# Patient Record
Sex: MALE | Race: WHITE
[De-identification: names, ages, dates, MRNs, and addresses within clinical notes are randomized per-mention and may not be internally consistent; named-entity substitution may affect disease eponyms.]

---

## 2017-06-11 ENCOUNTER — HOSPITAL ENCOUNTER (INPATIENT)
Dept: HOSPITAL 31 - C.ER | Age: 77
LOS: 4 days | Discharge: HOME | DRG: 190 | End: 2017-06-15
Attending: FAMILY MEDICINE | Admitting: FAMILY MEDICINE
Payer: MEDICARE

## 2017-06-11 VITALS — BODY MASS INDEX: 29.9 KG/M2

## 2017-06-11 DIAGNOSIS — J44.1: ICD-10-CM

## 2017-06-11 DIAGNOSIS — Z82.49: ICD-10-CM

## 2017-06-11 DIAGNOSIS — E78.5: ICD-10-CM

## 2017-06-11 DIAGNOSIS — N40.0: ICD-10-CM

## 2017-06-11 DIAGNOSIS — I10: ICD-10-CM

## 2017-06-11 DIAGNOSIS — E78.00: ICD-10-CM

## 2017-06-11 DIAGNOSIS — J18.9: ICD-10-CM

## 2017-06-11 DIAGNOSIS — J45.901: ICD-10-CM

## 2017-06-11 DIAGNOSIS — J44.0: Primary | ICD-10-CM

## 2017-06-11 LAB
ALBUMIN/GLOB SERPL: 1.2 {RATIO} (ref 1–2.1)
ALP SERPL-CCNC: 55 U/L (ref 38–126)
ALT SERPL-CCNC: 18 U/L (ref 21–72)
AST SERPL-CCNC: 19 U/L (ref 17–59)
BASOPHILS # BLD AUTO: 0.1 K/UL (ref 0–0.2)
BASOPHILS NFR BLD: 0.9 % (ref 0–2)
BILIRUB SERPL-MCNC: 1.1 MG/DL (ref 0.2–1.3)
BUN SERPL-MCNC: 20 MG/DL (ref 9–20)
CALCIUM SERPL-MCNC: 9.4 MG/DL (ref 8.6–10.4)
CHLORIDE SERPL-SCNC: 104 MMOL/L (ref 98–107)
CO2 SERPL-SCNC: 26 MMOL/L (ref 22–30)
EOSINOPHIL # BLD AUTO: 0.5 K/UL (ref 0–0.7)
EOSINOPHIL NFR BLD: 4.6 % (ref 0–4)
EOSINOPHIL NFR BLD: 6 % (ref 0–4)
ERYTHROCYTE [DISTWIDTH] IN BLOOD BY AUTOMATED COUNT: 14.1 % (ref 11.5–14.5)
GLOBULIN SER-MCNC: 3.3 GM/DL (ref 2.2–3.9)
GLUCOSE SERPL-MCNC: 121 MG/DL (ref 75–110)
HCT VFR BLD CALC: 39.5 % (ref 35–51)
LG PLATELETS BLD QL SMEAR: PRESENT
LYMPHOCYTES # BLD AUTO: 0.9 K/UL (ref 1–4.3)
LYMPHOCYTES NFR BLD AUTO: 8.7 % (ref 20–40)
MCH RBC QN AUTO: 30 PG (ref 27–31)
MCHC RBC AUTO-ENTMCNC: 33.7 G/DL (ref 33–37)
MCV RBC AUTO: 89.3 FL (ref 80–94)
MONOCYTES # BLD: 0.7 K/UL (ref 0–0.8)
MONOCYTES NFR BLD: 6.5 % (ref 0–10)
NEUTROPHILS NFR BLD AUTO: 80 % (ref 50–75)
NRBC BLD AUTO-RTO: 0 % (ref 0–2)
PLATELET # BLD: 184 K/UL (ref 130–400)
PMV BLD AUTO: 8.6 FL (ref 7.2–11.7)
POTASSIUM SERPL-SCNC: 3.7 MMOL/L (ref 3.6–5.2)
PROT SERPL-MCNC: 7.3 G/DL (ref 6.3–8.3)
SODIUM SERPL-SCNC: 141 MMOL/L (ref 132–148)
TOTAL CELLS COUNTED BLD: 100
WBC # BLD AUTO: 10.6 K/UL (ref 4.8–10.8)

## 2017-06-11 RX ADMIN — METHYLPREDNISOLONE SODIUM SUCCINATE SCH MG: 40 INJECTION, POWDER, FOR SOLUTION INTRAMUSCULAR; INTRAVENOUS at 16:34

## 2017-06-11 RX ADMIN — METHYLPREDNISOLONE SODIUM SUCCINATE SCH MG: 40 INJECTION, POWDER, FOR SOLUTION INTRAMUSCULAR; INTRAVENOUS at 22:15

## 2017-06-11 RX ADMIN — PANTOPRAZOLE SODIUM SCH MG: 40 TABLET, DELAYED RELEASE ORAL at 16:34

## 2017-06-11 RX ADMIN — Medication SCH: at 19:19

## 2017-06-11 RX ADMIN — IPRATROPIUM BROMIDE AND ALBUTEROL SULFATE SCH ML: .5; 3 SOLUTION RESPIRATORY (INHALATION) at 19:12

## 2017-06-11 NOTE — CP.PCM.CON
History of Present Illness





- History of Present Illness


History of Present Illness: 





Reason for consultation: Shortness of breath and cough





77-year-old male with history COPD/asthma presented to emergency room with 

complaints of SOB, wheezing and non productive cough for the past 3 days. 

Patient states he has been using his inhaler without relief. Denies fever, 

chest pain, nausea, vomiting, abdominal pain, leg swelling, weakness or numbness





Review of Systems





- Review of Systems


All systems: reviewed and no additional remarkable complaints except (Shortness 

of breath and cough)





Past Patient History





- Infectious Disease


Hx of Infectious Diseases: None





- Past Medical History & Family History


Past Medical History?: Yes





- Past Social History


Smoking Status: Never Smoked





- CARDIAC


Hx Hypercholesterolemia: Yes


Hx Hypertension: Yes





- PULMONARY


Hx Asthma: Yes


Hx Chronic Obstructive Pulmonary Disease (COPD): Yes





- NEUROLOGICAL


Hx Neurological Disorder: No





- HEENT


Hx HEENT Problems: Yes


Other/Comment: wear eyeglasses





- RENAL


Hx Chronic Kidney Disease: No





- ENDOCRINE/METABOLIC


Hx Endocrine Disorders: No





- HEMATOLOGICAL/ONCOLOGICAL


Hx Blood Disorders: No





- INTEGUMENTARY


Hx Dermatological Problems: No





- MUSCULOSKELETAL/RHEUMATOLOGICAL


Hx Falls: No





- GASTROINTESTINAL


Hx Gastritis: Yes





- GENITOURINARY/GYNECOLOGICAL


Hx Genitourinary Disorders: Yes


Hx Prostate Problems: Yes





- PSYCHIATRIC


Hx Anxiety: Yes


Hx Depression: Yes


Hx Substance Use: No





- SURGICAL HISTORY


Hx Appendectomy: Yes





- ANESTHESIA


Hx Anesthesia: Yes


Hx Anesthesia Reactions: No


Hx Malignant Hyperthermia: No





Meds


Allergies/Adverse Reactions: 


 Allergies











Allergy/AdvReac Type Severity Reaction Status Date / Time


 


No Known Allergies Allergy   Verified 06/11/17 10:48














- Medications


Medications: 


 Current Medications





Albuterol/Ipratropium (Duoneb 3 Mg/0.5 Mg (3 Ml) Ud)  3 ml INH RQ6 [facility]


Aspirin (Ecotrin)  81 mg PO DAILY [facility]


   Last Admin: 06/11/17 16:34 Dose:  81 mg


Finasteride (Proscar)  5 mg PO DAILY [facility]


Heparin Sodium (Porcine) (Heparin)  5,000 units SC Q12 [facility]


Hydrochlorothiazide (Microzide)  12.5 mg PO DAILY [facility]


Lisinopril (Zestril)  20 mg PO DAILY [facility]


   Last Admin: 06/11/17 16:34 Dose:  20 mg


Methylprednisolone (Solu-Medrol)  40 mg IVP Q6H [facility]


   Last Admin: 06/11/17 16:34 Dose:  40 mg


Montelukast Sodium (Singulair)  10 mg PO HS [facility]


Pantoprazole Sodium (Protonix Ec Tab)  40 mg PO DAILY [facility]


   Last Admin: 06/11/17 16:34 Dose:  40 mg


Rosuvastatin Calcium (Crestor)  10 mg PO HS [facility]


Fluticasone/Salmeterol (Advair Diskus 250/50)  1 puff IH RQD [facility]


Vitamin B Complex/Vitamin C (Berocca)  1 tab PO DAILY [facility]











Physical Exam





- Head Exam


Head Exam: ATRAUMATIC, NORMOCEPHALIC





- Eye Exam


Eye Exam: Normal appearance





- ENT Exam


ENT Exam: Mucous Membranes Moist





- Neck Exam


Neck exam: Positive for: Normal Inspection





- Respiratory Exam


Respiratory Exam: Rhonchi, Wheezes





- Cardiovascular Exam


Cardiovascular Exam: REGULAR RHYTHM





- GI/Abdominal Exam


GI & Abdominal Exam: Normal Bowel Sounds, Soft





- Extremities Exam


Extremities exam: Positive for: normal inspection





- Neurological Exam


Neurological exam: Alert, Oriented x3





Results





- Vital Signs


Recent Vital Signs: 


 Last Vital Signs











Temp  98.2 F   06/11/17 10:51


 


Pulse  95 H  06/11/17 15:26


 


Resp  20   06/11/17 15:26


 


BP  119/74   06/11/17 15:26


 


Pulse Ox  95   06/11/17 15:26














- Labs


Result Diagrams: 


 06/11/17 11:15





 06/11/17 11:15





Assessment & Plan


(1) COPD exacerbation


Status: Acute   


Comment: Started on IV steroids, nebulizer treatment.  Antibiotics.  Follow-up 

ABG

## 2017-06-11 NOTE — CP.PCM.HP
<Bill Figueroa - Last Filed: 17 17:32>





History of Present Illness





- History of Present Illness


History of Present Illness: 











CC:  "trouble breathing"





HPI:  Patient is a 77 year old male with a history of COPD, asthma, HTN, and 

hyperlipedmia.  He is here because of 3 days of worsening cough, wheezing, 

dyspnea, and some mild chest tightness and discomfort.  He also says he has 

felt some dizziness and feeling light headed as well but denies any room 

spining sensation or hearing loss.  He also denies fever, chills, palpitations, 

night sweats, recent weight changes, nausea, vomiting, diarrhea, heart burn, 

acid taste, dysuria, anxiety, depression, numbness, weakness, muscle weakness, 

joint pain or swelling.  He also reports taking the medication has prescribed.  








PMHx: COPD, Asthma, BPH, HTN, Hyperlipidemia


PSHx: Appendectomy, Prostate Surgery


Social: Denies current or past use of cigarettes, drugs, and alcohol. Worked 

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 

harmful toxins that precipitated his asthma/COPD. Lives alone.


Family History: Father,  at 74,  of "stomach pain and natural causes

", Mother,  at 74


Allergies: Seasonal NKDA


PMD: Dr. JOSE ANTONIO Chau (pt just got Medicare, haven't seen yet) 





Present on Admission





- Present on Admission


Any Indicators Present on Admission: No


History of DVT/PE: No


History of Uncontrolled Diabetes: No


Urinary Catheter: No


Decubitus Ulcer Present: No


History Surgical Site Infection Following: None





Review of Systems





- Review of Systems


All systems: reviewed and no additional remarkable complaints except





- Constitutional


Constitutional: absent: Chills, Fever





- EENT


Eyes: absent: Change in Vision, Other Visual Disturbances


Ears: absent: Dizziness





Past Patient History





- Infectious Disease


Hx of Infectious Diseases: None





- Past Medical History & Family History


Past Medical History?: Yes





- Past Social History


Smoking Status: Never Smoked





- CARDIAC


Hx Hypercholesterolemia: Yes


Hx Hypertension: Yes





- PULMONARY


Hx Asthma: Yes


Hx Chronic Obstructive Pulmonary Disease (COPD): Yes





- NEUROLOGICAL


Hx Neurological Disorder: No





- HEENT


Hx HEENT Problems: Yes


Other/Comment: wear eyeglasses





- RENAL


Hx Chronic Kidney Disease: No





- ENDOCRINE/METABOLIC


Hx Endocrine Disorders: No





- HEMATOLOGICAL/ONCOLOGICAL


Hx Blood Disorders: No





- INTEGUMENTARY


Hx Dermatological Problems: No





- MUSCULOSKELETAL/RHEUMATOLOGICAL


Hx Falls: No





- GASTROINTESTINAL


Hx Gastritis: Yes





- GENITOURINARY/GYNECOLOGICAL


Hx Genitourinary Disorders: Yes


Hx Prostate Problems: Yes





- PSYCHIATRIC


Hx Anxiety: Yes


Hx Depression: Yes


Hx Substance Use: No





- SURGICAL HISTORY


Hx Appendectomy: Yes





- ANESTHESIA


Hx Anesthesia: Yes


Hx Anesthesia Reactions: No


Hx Malignant Hyperthermia: No





Meds


Allergies/Adverse Reactions: 


 Allergies











Allergy/AdvReac Type Severity Reaction Status Date / Time


 


No Known Allergies Allergy   Verified 17 10:48














Physical Exam





- Constitutional


Appears: Non-toxic, No Acute Distress





- Head Exam


Head Exam: NORMAL INSPECTION





- ENT Exam


ENT Exam: Mucous Membranes Moist, Normal Exam





- Neck Exam


Neck exam: Positive for: Normal Inspection





- Respiratory Exam


Respiratory Exam: Decreased Breath Sounds, Wheezes, NORMAL BREATHING PATTERN.  

absent: Clear to Auscultation Bilateral, Rales, Rhonchi





- Cardiovascular Exam


Cardiovascular Exam: Tachycardia, REGULAR RHYTHM, +S1, +S2.  absent: Gallop, RRR

, Rubs





- GI/Abdominal Exam


GI & Abdominal Exam: absent: Guarding, Hernia





- Extremities Exam


Extremities exam: Positive for: normal inspection.  Negative for: pedal edema





- Back Exam


Back exam: NORMAL INSPECTION





- Neurological Exam


Neurological exam: Oriented x3





- Psychiatric Exam


Psychiatric exam: Normal Affect, Normal Mood





- Skin


Skin Exam: Dry, Pallor, Warm





Results





- Vital Signs


Recent Vital Signs: 





 Last Vital Signs











Temp  98.2 F   17 10:51


 


Pulse  95 H  17 15:26


 


Resp  20   17 15:26


 


BP  119/74   17 15:26


 


Pulse Ox  95   17 15:26














- Labs


Result Diagrams: 


 17 11:15





 17 11:15





Assessment & Plan





- Assessment and Plan (Free Text)


Assessment: 








1.  COPD -acute-





Patient admitted to regular in patient floor.  He was given 4 duoneb treatments 

and IV Avelox.  We will continue the IV Avelox 400mg daily and also Duoneb q6H, 

continue his advair 250/50, and Singular 10mg HS.  Have also started Solumedrol 

40mg IVP q6h. 





Follow up morning cbc,cmp,mag,phos and also blood cultures. 





ED x:ray was shows evidence of COPD, and also a right lower lobe infiltrate. 


CBC shows a left shift with a high absolute neutrophil count. 





Dr. Costello pulm consult, help appreciated. 





2.  BPH -chronic-


Proscar 5mg daily





3.  HTN


Zestril 20 and HCTZ 12.5mg, monitor vital signs q4h





4.  Hyperlipedmia


Crestor 10mg





5. Prophylatic measure. 


Heparin 5000 units SC Q12H, SCDs, and also protonix 40mg, B Vitamin complex. 














<Keenan Garrison - Last Filed: 17 17:50>





Results





- Vital Signs


Recent Vital Signs: 





 Last Vital Signs











Temp  97.6 F   17 17:19


 


Pulse  115 H  17 17:19


 


Resp  22   17 17:19


 


BP  113/69   17 17:19


 


Pulse Ox  91 L  17 17:19














- Labs


Result Diagrams: 


 17 11:15





 17 11:15





Attending/Attestation





- Attestation


I have personally seen and examined this patient.: Yes


I have fully participated in the care of the patient.: Yes


I have reviewed all pertinent clinical information: Yes


Notes (Text): 





17 17:50


Patient was seen and examined at bedside with the resident


Patient complains of for shortness of breath


Currently he is on oxygen by nasal cannula


We will start the patient on steroids and nebulizing treatment  and we'll also 

start the patient on IV antibiotics


Pulmonary evaluation has been requested


I discussed the plan of care with the resident and agree with the above history 

and physical and assessment/plan but the resident.

## 2017-06-11 NOTE — C.PDOC
History Of Present Illness


77 yr old male with PMHx of asthma presents to the ER c/o SOB, wheezing and non 

productive cough for the past 3 days. He has been using his inhaler without 

relief.  Patient denies fever, chest pain, nausea, vomiting, abdominal pain, 

leg swelling.


Time Seen by Provider: 06/11/17 10:55


Chief Complaint (Nursing): Shortness Of Breath


History Per: Patient


History/Exam Limitations: no limitations


Onset/Duration Of Symptoms: Days (3)


Current Symptoms Are (Timing): Still Present


Initiating Event: Upper Respiratory Illness


Current Respiratory Medications: See Home Med List


Severity: Moderate





Past Medical History


Reviewed: Historical Data, Nursing Documentation, Vital Signs


Vital Signs: 


 Last Vital Signs











Temp  97.5 F L  06/15/17 07:00


 


Pulse  56 L  06/15/17 10:27


 


Resp  20   06/15/17 07:00


 


BP  148/82   06/15/17 10:27


 


Pulse Ox  96   06/15/17 07:00














- Medical History


PMH: Anxiety, Asthma, Benign Prostatic Hyperplasia, COPD, Depression, Gastritis

, HTN, Hypercholesterolemia


Surgical History: Appendectomy





- [facility] Procedures








INJECT/INFUSE NEC (03/19/14)


SUPPLEMENT ABDOMINAL WALL WITH SYNTH SUB, PERC ENDO APPROACH (10/29/15)


VACCINATION NEC (06/02/13)








Family History: States: No Known Family Hx





- Social History


Hx Tobacco Use: No


Hx Alcohol Use: No


Hx Substance Use: No





- Immunization History


Hx Tetanus Toxoid Vaccination: Yes


Hx Influenza Vaccination: Yes


Hx Pneumococcal Vaccination: Yes





Review Of Systems


Except As Marked, All Systems Reviewed And Found Negative.


Constitutional: Negative for: Fever


Cardiovascular: Negative for: Chest Pain, Palpitations


Respiratory: Positive for: Cough (Non productive), Shortness of Breath, Wheezing


Gastrointestinal: Negative for: Nausea, Vomiting, Abdominal Pain


Musculoskeletal: Positive for: Other ((-) Leg swelling.)


Skin: Negative for: Rash


Neurological: Negative for: Weakness, Numbness





Physical Exam





- Physical Exam


Appears: Well, Non-toxic, In Acute Distress (Mild discomfort), Other ((+) 

Speaking in full sentences.)


Skin: Warm, Dry, No Rash


Head: Normacephalic


Oral Mucosa: Moist


Throat: Normal, No Erythema, No Exudate


Neck: Normal, Normal ROM


Cardiovascular: Rhythm Regular, Other (Tachycardic)


Respiratory: Normal Breath Sounds, No Accessory Muscle Use, No Rales, No Rhonchi

, Wheezing (diffuse expiratory wheezing B/L)


Gastrointestinal/Abdominal: Normal Exam, Bowel Sounds, Soft, No Tenderness


Extremity: Normal ROM, No Pedal Edema, No Calf Tenderness, No Swelling


Pulses: Left Dorsalis Pedis: Normal, Right Dorsalis Pedis: Normal


Neurological/Psych: Oriented x3





ED Course And Treatment





- Laboratory Results


Result Diagrams: 


 06/15/17 06:29





 06/15/17 06:29


ECG: Interpreted By Me, Viewed By Me (sinus tachycardia 116 bpm, normal axis, 

no acute ST/T wave changes)


O2 Sat by Pulse Oximetry: 92 (RA)


Pulse Ox Interpretation: Abnormal





- Other Rad


  ** cxr 


X-Ray: Viewed By Me, Read By Radiologist


Interpretation: Accession No. : [id number].  Patient Name / ID : JENNA LOOMIS  / [id number].  Exam Date : 06/11/2017 11:18:46 ( Approved ).  

Study Comment :  Sex / Age : M  / 77Y.  Creator : Antonio Joyner MD.  Dictator 

: Antonio Joyner MD.   :  Approver : Antonio Joyner MD.  Approver2 :  

Report Date : 06/11/2017 14:26:48.  My Comment :  ******************************

*****************************************************.  PROCEDURE:  CHEST 

RADIOGRAPH, 1 VIEW.  HISTORY:  Shortness of breath.  COMPARISON:  06/02/2016.  

FINDINGS:  LUNGS:  Diffuse increased interstitial lung markings.  Biapical 

pleural thickening with upper lobe granulomatous changes.  Left hilar 

prominence.  Patchy consolidation within the right infrahilar region and right 

lung base.  PLEURA:  As above.  CARDIOVASCULAR:  Normal.  OSSEOUS STRUCTURES:  

Degenerative changes in the spine and shoulders with calcific tendinopathy of 

the right proximal humerus.  VISUALIZED UPPER ABDOMEN:  Normal.  OTHER FINDINGS

:  None.  IMPRESSION:  Diffuse increased interstitial lung markings.  Biapical 

pleural thickening with upper lobe granulomatous changes.  Left hilar 

prominence.  Patchy consolidation within the right infrahilar region and right 

lung base.


Progress Note: Blood work, CXR, EKG ordered and reviewed.  Patient given IV 

solumedrol, duoneb treatments.


Reevaluation Time: 15:00


Reassessment Condition: Improved (Patient mildly improved, but on exam still 

has expiratory wheezing B/L.  CXR read as possible infiltrate on right, IV 

avelox ordered.  Will admit patient to hospitalist service.)





- Physician Consult Information


Physician Contacted: Keenan Garrison


Outcome Of Conversation: Patient unable to provide info about who is his PMD is

, has no Rx bottles, and states his current pharymacy is closed.  Has multiple 

prior admissions to multiple prior docs, likely being admitted to medical 

service.  Spoke with hospitalist, agrees with admission for copd/asthma 

exacerbation, possible pneumonia.





Critical Care Time





- Critical Care Note


Total Time (in mins): 35


Documented critical care: time excludes all time spent performing seperately 

billable procedures.





Disposition





- Disposition


Disposition: HOSPITALIZED


Disposition Time: 15:11


Condition: FAIR





- Clinical Impression


Clinical Impression: 


 COPD exacerbation, Asthma, Dyspnea, Pneumonia








- Scribe Statement


The provider has reviewed the documentation as recorded by the Scribe


Yanique Pierce


Provider Attestation: 


All medical record entries made by the Scribe were at my direction and 

personally dictated by me. I have reviewed the chart and agree that the record 

accurately reflects my personal performance of the history, physical exam, 

medical decision making, and the department course for this patient. I have 

also personally directed, reviewed, and agree with the discharge instructions 

and disposition.





Decision To Admit





- Pt Status Changed To:


Hospital Disposition Of: Inpatient





- Admit Certification


Admit to Inpatient:: After my assessment, the patient will require 

hospitalization for at least two midnights.  This is because of the severity of 

symptoms shown, intensity of services needed, and/or the medical risk in this 

patient being treated as an outpatient.





- InPatient:


Physician Admission Certification: I certify that this patient requires 2 or 

more midnights of care for the following reason:: see notes





- .


Bed Request Type: Telemetry


Admitting Physician: Keenan Garrison


Patient Diagnosis: 


 COPD exacerbation, Asthma, Dyspnea, Pneumonia

## 2017-06-11 NOTE — RAD
PROCEDURE:  CHEST RADIOGRAPH, 1 VIEW



HISTORY:

Shortness of breath 



COMPARISON:

06/02/2016



FINDINGS:



LUNGS:

Diffuse increased interstitial lung markings.  Biapical pleural 

thickening with upper lobe granulomatous changes.  Left hilar 

prominence.  Patchy consolidation within the right infrahilar region 

and right lung base.  



PLEURA:

As above.



CARDIOVASCULAR:

Normal.



OSSEOUS STRUCTURES:

Degenerative changes in the spine and shoulders with calcific 

tendinopathy of the right proximal humerus.



VISUALIZED UPPER ABDOMEN:

Normal.



OTHER FINDINGS:

None. 



IMPRESSION:

Diffuse increased interstitial lung markings.  Biapical pleural 

thickening with upper lobe granulomatous changes.  Left hilar 

prominence.  Patchy consolidation within the right infrahilar region 

and right lung base.

## 2017-06-12 LAB
ALBUMIN/GLOB SERPL: 1.1 {RATIO} (ref 1–2.1)
ALP SERPL-CCNC: 54 U/L (ref 38–126)
ALT SERPL-CCNC: 18 U/L (ref 21–72)
ARTERIAL PATENCY WRIST A: (no result)
AST SERPL-CCNC: 24 U/L (ref 17–59)
BASOPHILS # BLD AUTO: 0 K/UL (ref 0–0.2)
BASOPHILS NFR BLD: 0.1 % (ref 0–2)
BILIRUB SERPL-MCNC: 0.7 MG/DL (ref 0.2–1.3)
BUN SERPL-MCNC: 25 MG/DL (ref 9–20)
CALCIUM SERPL-MCNC: 9 MG/DL (ref 8.6–10.4)
CHLORIDE SERPL-SCNC: 99 MMOL/L (ref 98–107)
CO2 SERPL-SCNC: 26 MMOL/L (ref 22–30)
COHGB MFR BLD: 1.5 % (ref 0.5–1.5)
DRAW SITE: (no result)
EOSINOPHIL # BLD AUTO: 0 K/UL (ref 0–0.7)
EOSINOPHIL NFR BLD: 0.1 % (ref 0–4)
ERYTHROCYTE [DISTWIDTH] IN BLOOD BY AUTOMATED COUNT: 13.9 % (ref 11.5–14.5)
GLOBULIN SER-MCNC: 3.3 GM/DL (ref 2.2–3.9)
GLUCOSE SERPL-MCNC: 157 MG/DL (ref 75–110)
HCT VFR BLD CALC: 38.1 % (ref 35–51)
HHB: 1.3 % (ref 0–5)
LYMPHOCYTES # BLD AUTO: 0.8 K/UL (ref 1–4.3)
LYMPHOCYTES NFR BLD AUTO: 5.8 % (ref 20–40)
MAGNESIUM SERPL-MCNC: 2.1 MG/DL (ref 1.6–2.3)
MCH RBC QN AUTO: 29.8 PG (ref 27–31)
MCHC RBC AUTO-ENTMCNC: 33.3 G/DL (ref 33–37)
MCV RBC AUTO: 89.4 FL (ref 80–94)
METHGB MFR BLD: 1.5 % (ref 0–3)
MONOCYTES # BLD: 0.4 K/UL (ref 0–0.8)
MONOCYTES NFR BLD: 3.2 % (ref 0–10)
NEUTROPHILS NFR BLD AUTO: 90 % (ref 50–75)
NRBC BLD AUTO-RTO: 0 % (ref 0–2)
PHOSPHATE SERPL-MCNC: 3.4 MG/DL (ref 2.5–4.5)
PLATELET # BLD: 191 K/UL (ref 130–400)
PMV BLD AUTO: 8.8 FL (ref 7.2–11.7)
POTASSIUM SERPL-SCNC: 3.7 MMOL/L (ref 3.6–5.2)
PROT SERPL-MCNC: 7 G/DL (ref 6.3–8.3)
SAO2 % BLDA: 95.7 % (ref 95–98)
SODIUM SERPL-SCNC: 135 MMOL/L (ref 132–148)
TOTAL CELLS COUNTED BLD: 100
WBC # BLD AUTO: 12.9 K/UL (ref 4.8–10.8)

## 2017-06-12 RX ADMIN — IPRATROPIUM BROMIDE AND ALBUTEROL SULFATE SCH ML: .5; 3 SOLUTION RESPIRATORY (INHALATION) at 23:37

## 2017-06-12 RX ADMIN — PANTOPRAZOLE SODIUM SCH MG: 40 TABLET, DELAYED RELEASE ORAL at 10:07

## 2017-06-12 RX ADMIN — IPRATROPIUM BROMIDE AND ALBUTEROL SULFATE SCH ML: .5; 3 SOLUTION RESPIRATORY (INHALATION) at 15:57

## 2017-06-12 RX ADMIN — IPRATROPIUM BROMIDE AND ALBUTEROL SULFATE SCH ML: .5; 3 SOLUTION RESPIRATORY (INHALATION) at 19:57

## 2017-06-12 RX ADMIN — METHYLPREDNISOLONE SODIUM SUCCINATE SCH MG: 40 INJECTION, POWDER, FOR SOLUTION INTRAMUSCULAR; INTRAVENOUS at 17:59

## 2017-06-12 RX ADMIN — METHYLPREDNISOLONE SODIUM SUCCINATE SCH MG: 40 INJECTION, POWDER, FOR SOLUTION INTRAMUSCULAR; INTRAVENOUS at 10:07

## 2017-06-12 RX ADMIN — IPRATROPIUM BROMIDE AND ALBUTEROL SULFATE SCH ML: .5; 3 SOLUTION RESPIRATORY (INHALATION) at 01:27

## 2017-06-12 RX ADMIN — IPRATROPIUM BROMIDE AND ALBUTEROL SULFATE SCH ML: .5; 3 SOLUTION RESPIRATORY (INHALATION) at 08:18

## 2017-06-12 RX ADMIN — METHYLPREDNISOLONE SODIUM SUCCINATE SCH MG: 40 INJECTION, POWDER, FOR SOLUTION INTRAMUSCULAR; INTRAVENOUS at 04:31

## 2017-06-12 RX ADMIN — MOXIFLOXACIN HYDROCHLORIDE SCH MLS/HR: 400 INJECTION, SOLUTION INTRAVENOUS at 18:01

## 2017-06-12 RX ADMIN — FLUTICASONE PROPIONATE AND SALMETEROL SCH PUFF: 50; 250 POWDER RESPIRATORY (INHALATION) at 08:18

## 2017-06-12 RX ADMIN — Medication SCH TAB: at 10:07

## 2017-06-12 RX ADMIN — IPRATROPIUM BROMIDE AND ALBUTEROL SULFATE SCH ML: .5; 3 SOLUTION RESPIRATORY (INHALATION) at 13:55

## 2017-06-12 RX ADMIN — METHYLPREDNISOLONE SODIUM SUCCINATE SCH MG: 40 INJECTION, POWDER, FOR SOLUTION INTRAMUSCULAR; INTRAVENOUS at 22:26

## 2017-06-12 NOTE — CARD
--------------- APPROVED REPORT --------------





EKG Measurement

Heart [id number]

OR [id number]

[id number]

[id number]

[id number]



<Conclusion>

Sinus tachycardia

Otherwise normal ECG

## 2017-06-12 NOTE — CP.PCM.PN
Subjective





- Date & Time of Evaluation


Date of Evaluation: 06/12/17


Time of Evaluation: 09:00





- Subjective


Subjective: 





Patient seen and examined.


Still complaining of shortness of breath and cough


Dyspnea on minimal exertion





Objective





- Vital Signs/Intake and Output


Vital Signs (last 24 hours): 


 











Temp Pulse Resp BP Pulse Ox


 


 97.7 F   75   20   126/76   95 


 


 06/12/17 07:00  06/12/17 10:05  06/12/17 07:00  06/12/17 10:05  06/12/17 07:00








Intake and Output: 


 











 06/12/17 06/12/17





 06:59 18:59


 


Intake Total 480 400


 


Balance 480 400














- Medications


Medications: 


 Current Medications





Albuterol/Ipratropium (Duoneb 3 Mg/0.5 Mg (3 Ml) Ud)  3 ml INH Q2 PRN


   PRN Reason: SHORTNESS OF BREATH


Albuterol/Ipratropium (Duoneb 3 Mg/0.5 Mg (3 Ml) Ud)  3 ml INH RQ4 [facility]


   Last Admin: 06/12/17 15:57 Dose:  3 ml


Aspirin (Ecotrin)  81 mg PO DAILY [facility]


   Last Admin: 06/12/17 10:08 Dose:  81 mg


Finasteride (Proscar)  5 mg PO DAILY [facility]


   Last Admin: 06/12/17 10:08 Dose:  5 mg


Heparin Sodium (Porcine) (Heparin)  5,000 units SC Q12 [facility]


   Last Admin: 06/12/17 10:07 Dose:  Not Given


Hydrochlorothiazide (Microzide)  12.5 mg PO DAILY [facility]


   Last Admin: 06/12/17 10:08 Dose:  12.5 mg


Moxifloxacin HCl (Avelox Iv 400mg/250ml Ns)  400 mg in 250 mls @ 167 mls/hr 

IVPB Q24H [facility]


Lisinopril (Zestril)  20 mg PO DAILY [facility]


   Last Admin: 06/12/17 10:07 Dose:  20 mg


Methylprednisolone (Solu-Medrol)  40 mg IVP Q6H [facility]


   Last Admin: 06/12/17 10:07 Dose:  40 mg


Montelukast Sodium (Singulair)  10 mg PO HS [facility]


   Last Admin: 06/11/17 22:15 Dose:  10 mg


Pantoprazole Sodium (Protonix Ec Tab)  40 mg PO DAILY [facility]


   Last Admin: 06/12/17 10:07 Dose:  40 mg


Rosuvastatin Calcium (Crestor)  10 mg PO HS [facility]


   Last Admin: 06/11/17 22:14 Dose:  10 mg


Fluticasone/Salmeterol (Advair Diskus 250/50)  1 puff IH RQD [facility]


   Last Admin: 06/12/17 08:18 Dose:  1 puff


Vitamin B Complex/Vitamin C (Berocca)  1 tab PO DAILY [facility]


   Last Admin: 06/12/17 10:07 Dose:  1 tab











- Labs


Labs: 


 





 06/12/17 06:52 





 06/12/17 06:52 











- Constitutional


Appears: No Acute Distress





- Head Exam


Head Exam: ATRAUMATIC, NORMOCEPHALIC





- Eye Exam


Eye Exam: Normal appearance





- ENT Exam


ENT Exam: Mucous Membranes Moist





- Neck Exam


Neck Exam: Normal Inspection





- Respiratory Exam


Respiratory Exam: Rhonchi, Wheezes





- Cardiovascular Exam


Cardiovascular Exam: REGULAR RHYTHM





- GI/Abdominal Exam


GI & Abdominal Exam: Soft, Normal Bowel Sounds





- Extremities Exam


Extremities Exam: Full ROM, Normal Inspection





- Neurological Exam


Neurological Exam: Alert, Oriented x3





Assessment and Plan


(1) COPD exacerbation


Assessment & Plan: 


Continue nebulizer treatment, IV steroids and antitussives for now


Status: Acute

## 2017-06-12 NOTE — CP.PCM.PN
Subjective





- Date & Time of Evaluation


Date of Evaluation: 06/12/17


Time of Evaluation: 07:47





- Subjective


Subjective: 





PGY1 progress note for Dr. Ennis:





Patient seen and examined.  Patient states he feels his breathing is the same 

and that he feels short of breath without oxygen. Patient states his asthma is 

very bad and he can hear himself wheeze.  Patient denies ever smoking but 

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 

such as bleach for many years.





Objective





- Vital Signs/Intake and Output


Vital Signs (last 24 hours): 


 











Temp Pulse Resp BP Pulse Ox


 


 97.6 F   60   20   110/63   94 L


 


 06/12/17 04:20  06/12/17 04:20  06/12/17 04:20  06/12/17 04:20  06/12/17 04:20








Intake and Output: 


 











 06/12/17 06/12/17





 06:59 18:59


 


Intake Total 480 


 


Balance 480 














- Medications


Medications: 


 Current Medications





Albuterol/Ipratropium (Duoneb 3 Mg/0.5 Mg (3 Ml) Ud)  3 ml INH RQ6 [facility]


   Last Admin: 06/12/17 01:27 Dose:  3 ml


Albuterol/Ipratropium (Duoneb 3 Mg/0.5 Mg (3 Ml) Ud)  3 ml INH Q3 PRN


   PRN Reason: Shortness of Breath


Aspirin (Ecotrin)  81 mg PO DAILY [facility]


   Last Admin: 06/11/17 16:34 Dose:  81 mg


Finasteride (Proscar)  5 mg PO DAILY [facility]


Heparin Sodium (Porcine) (Heparin)  5,000 units SC Q12 [facility]


   Last Admin: 06/11/17 22:17 Dose:  Not Given


Hydrochlorothiazide (Microzide)  12.5 mg PO DAILY [facility]


   Last Admin: 06/11/17 19:20 Dose:  Not Given


Moxifloxacin HCl (Avelox Iv 400mg/250ml Ns)  400 mg in 250 mls @ 167 mls/hr 

IVPB Q24H [facility]


Lisinopril (Zestril)  20 mg PO DAILY [facility]


   Last Admin: 06/11/17 16:34 Dose:  20 mg


Methylprednisolone (Solu-Medrol)  40 mg IVP Q6H [facility]


   Last Admin: 06/12/17 04:31 Dose:  40 mg


Montelukast Sodium (Singulair)  10 mg PO HS [facility]


   Last Admin: 06/11/17 22:15 Dose:  10 mg


Pantoprazole Sodium (Protonix Ec Tab)  40 mg PO DAILY [facility]


   Last Admin: 06/11/17 16:34 Dose:  40 mg


Rosuvastatin Calcium (Crestor)  10 mg PO HS [facility]


   Last Admin: 06/11/17 22:14 Dose:  10 mg


Fluticasone/Salmeterol (Advair Diskus 250/50)  1 puff IH RQD [facility]


Vitamin B Complex/Vitamin C (Berocca)  1 tab PO DAILY [facility]


   Last Admin: 06/11/17 19:19 Dose:  Not Given











- Labs


Labs: 


 





 06/12/17 06:52 





 06/12/17 06:52 











- Constitutional


Appears: No Acute Distress





- Head Exam


Head Exam: ATRAUMATIC, NORMOCEPHALIC





- Eye Exam


Eye Exam: EOMI





- ENT Exam


ENT Exam: Mucous Membranes Moist





- Respiratory Exam


Respiratory Exam: Wheezes (inspiratory and expiratory)





- Cardiovascular Exam


Cardiovascular Exam: +S1, +S2





- GI/Abdominal Exam


GI & Abdominal Exam: Soft, Normal Bowel Sounds.  absent: Tenderness





- Extremities Exam


Extremities Exam: absent: Pedal Edema





- Neurological Exam


Neurological Exam: Alert, Awake





- Psychiatric Exam


Psychiatric exam: Normal Affect





- Skin


Skin Exam: Warm





Assessment and Plan





- Assessment and Plan (Free Text)


Assessment: 





1.  COPD 


continue the IV Avelox 400mg daily 


Duoneb q4H scheduled with q2 prn


continue his advair 250/50


continue Singular 10mg HS


continue Solumedrol 40mg IVP q6h





Follow up morning cbc,cmp,mag,phos and also blood cultures. 





ED x:ray was shows evidence of COPD, and also a right lower lobe infiltrate. 


CBC on admission shows a left shift with a high absolute neutrophil count. 





Dr. Costello pulm consult, help appreciated- continue IV steroids, nebulizers, 

antibiotics, F/U ABG





2.  BPH 


Proscar 5mg daily





3.  HTN


Zestril 20 and HCTZ 12.5mg, monitor vital signs q4h





4.  Hyperlipedmia


Crestor 10mg





5. Prophylatic measure


Heparin 5000 units SC Q12H


SCDs


protonix 40mg


B Vitamin complex.

## 2017-06-13 LAB
ALBUMIN/GLOB SERPL: 1.2 {RATIO} (ref 1–2.1)
ALP SERPL-CCNC: 48 U/L (ref 38–126)
ALT SERPL-CCNC: 16 U/L (ref 21–72)
AST SERPL-CCNC: 16 U/L (ref 17–59)
BASOPHILS # BLD AUTO: 0 K/UL (ref 0–0.2)
BASOPHILS NFR BLD: 0.1 % (ref 0–2)
BILIRUB SERPL-MCNC: 0.6 MG/DL (ref 0.2–1.3)
BUN SERPL-MCNC: 29 MG/DL (ref 9–20)
CALCIUM SERPL-MCNC: 8.8 MG/DL (ref 8.6–10.4)
CHLORIDE SERPL-SCNC: 100 MMOL/L (ref 98–107)
CO2 SERPL-SCNC: 28 MMOL/L (ref 22–30)
EOSINOPHIL # BLD AUTO: 0 K/UL (ref 0–0.7)
EOSINOPHIL NFR BLD: 0 % (ref 0–4)
ERYTHROCYTE [DISTWIDTH] IN BLOOD BY AUTOMATED COUNT: 14 % (ref 11.5–14.5)
GLOBULIN SER-MCNC: 3 GM/DL (ref 2.2–3.9)
GLUCOSE SERPL-MCNC: 148 MG/DL (ref 75–110)
HCT VFR BLD CALC: 37.7 % (ref 35–51)
LYMPHOCYTES # BLD AUTO: 0.8 K/UL (ref 1–4.3)
LYMPHOCYTES NFR BLD AUTO: 5.3 % (ref 20–40)
MCH RBC QN AUTO: 30 PG (ref 27–31)
MCHC RBC AUTO-ENTMCNC: 33.3 G/DL (ref 33–37)
MCV RBC AUTO: 89.9 FL (ref 80–94)
MONOCYTES # BLD: 0.6 K/UL (ref 0–0.8)
MONOCYTES NFR BLD: 4.4 % (ref 0–10)
NEUTROPHILS NFR BLD AUTO: 92 % (ref 50–75)
NRBC BLD AUTO-RTO: 0 % (ref 0–2)
PLATELET # BLD: 206 K/UL (ref 130–400)
PMV BLD AUTO: 8.9 FL (ref 7.2–11.7)
POTASSIUM SERPL-SCNC: 4 MMOL/L (ref 3.6–5.2)
PROT SERPL-MCNC: 6.5 G/DL (ref 6.3–8.3)
SODIUM SERPL-SCNC: 137 MMOL/L (ref 132–148)
TOTAL CELLS COUNTED BLD: 100
WBC # BLD AUTO: 14.4 K/UL (ref 4.8–10.8)

## 2017-06-13 RX ADMIN — METHYLPREDNISOLONE SODIUM SUCCINATE SCH MG: 40 INJECTION, POWDER, FOR SOLUTION INTRAMUSCULAR; INTRAVENOUS at 05:27

## 2017-06-13 RX ADMIN — FLUTICASONE PROPIONATE AND SALMETEROL SCH PUFF: 50; 250 POWDER RESPIRATORY (INHALATION) at 08:11

## 2017-06-13 RX ADMIN — METHYLPREDNISOLONE SODIUM SUCCINATE SCH MG: 40 INJECTION, POWDER, FOR SOLUTION INTRAMUSCULAR; INTRAVENOUS at 09:34

## 2017-06-13 RX ADMIN — IPRATROPIUM BROMIDE AND ALBUTEROL SULFATE SCH ML: .5; 3 SOLUTION RESPIRATORY (INHALATION) at 23:39

## 2017-06-13 RX ADMIN — IPRATROPIUM BROMIDE AND ALBUTEROL SULFATE SCH ML: .5; 3 SOLUTION RESPIRATORY (INHALATION) at 19:07

## 2017-06-13 RX ADMIN — IPRATROPIUM BROMIDE AND ALBUTEROL SULFATE SCH ML: .5; 3 SOLUTION RESPIRATORY (INHALATION) at 15:37

## 2017-06-13 RX ADMIN — MOXIFLOXACIN HYDROCHLORIDE SCH MLS/HR: 400 INJECTION, SOLUTION INTRAVENOUS at 17:23

## 2017-06-13 RX ADMIN — METHYLPREDNISOLONE SODIUM SUCCINATE SCH MG: 40 INJECTION, POWDER, FOR SOLUTION INTRAMUSCULAR; INTRAVENOUS at 22:02

## 2017-06-13 RX ADMIN — Medication SCH TAB: at 09:14

## 2017-06-13 RX ADMIN — IPRATROPIUM BROMIDE AND ALBUTEROL SULFATE SCH ML: .5; 3 SOLUTION RESPIRATORY (INHALATION) at 08:11

## 2017-06-13 RX ADMIN — PANTOPRAZOLE SODIUM SCH MG: 40 TABLET, DELAYED RELEASE ORAL at 09:14

## 2017-06-13 RX ADMIN — IPRATROPIUM BROMIDE AND ALBUTEROL SULFATE SCH ML: .5; 3 SOLUTION RESPIRATORY (INHALATION) at 03:09

## 2017-06-13 RX ADMIN — IPRATROPIUM BROMIDE AND ALBUTEROL SULFATE SCH ML: .5; 3 SOLUTION RESPIRATORY (INHALATION) at 11:30

## 2017-06-13 RX ADMIN — METHYLPREDNISOLONE SODIUM SUCCINATE SCH MG: 40 INJECTION, POWDER, FOR SOLUTION INTRAMUSCULAR; INTRAVENOUS at 17:22

## 2017-06-13 NOTE — CP.PCM.PN
Subjective





- Date & Time of Evaluation


Date of Evaluation: 06/13/17


Time of Evaluation: 16:00





- Subjective


Subjective: 





Patient still complaining of shortness of breath and cough


Patient states that he feels better with the oxygen


Denies fever or chills, denies chest pain





Objective





- Vital Signs/Intake and Output


Vital Signs (last 24 hours): 


 











Temp Pulse Resp BP Pulse Ox


 


 97.7 F   61   20   130/70   96 


 


 06/13/17 15:03  06/13/17 15:03  06/13/17 15:03  06/13/17 15:03  06/13/17 15:03








Intake and Output: 


 











 06/13/17 06/13/17





 06:59 18:59


 


Intake Total 120 400


 


Balance 120 400














- Medications


Medications: 


 Current Medications





Albuterol/Ipratropium (Duoneb 3 Mg/0.5 Mg (3 Ml) Ud)  3 ml INH Q2 PRN


   PRN Reason: SHORTNESS OF BREATH


Albuterol/Ipratropium (Duoneb 3 Mg/0.5 Mg (3 Ml) Ud)  3 ml INH RQ4 [facility]


   Last Admin: 06/13/17 15:37 Dose:  3 ml


Aspirin (Ecotrin)  81 mg PO DAILY [facility]


   Last Admin: 06/13/17 09:14 Dose:  81 mg


Finasteride (Proscar)  5 mg PO DAILY [facility]


   Last Admin: 06/13/17 09:14 Dose:  5 mg


Heparin Sodium (Porcine) (Heparin)  5,000 units SC Q12 [facility]


   Last Admin: 06/13/17 09:34 Dose:  5,000 units


Hydrochlorothiazide (Microzide)  12.5 mg PO DAILY [facility]


   Last Admin: 06/13/17 09:14 Dose:  12.5 mg


Moxifloxacin HCl (Avelox Iv 400mg/250ml Ns)  400 mg in 250 mls @ 167 mls/hr 

IVPB Q24H [facility]


   Last Admin: 06/13/17 17:23 Dose:  167 mls/hr


Lisinopril (Zestril)  20 mg PO DAILY [facility]


   Last Admin: 06/13/17 09:14 Dose:  20 mg


Methylprednisolone (Solu-Medrol)  40 mg IVP Q6H [facility]


   Last Admin: 06/13/17 17:22 Dose:  40 mg


Montelukast Sodium (Singulair)  10 mg PO HS [facility]


   Last Admin: 06/12/17 22:26 Dose:  10 mg


Pantoprazole Sodium (Protonix Ec Tab)  40 mg PO DAILY [facility]


   Last Admin: 06/13/17 09:14 Dose:  40 mg


Rosuvastatin Calcium (Crestor)  10 mg PO HS [facility]


   Last Admin: 06/12/17 22:26 Dose:  10 mg


Fluticasone/Salmeterol (Advair Diskus 250/50)  1 puff IH RQD [facility]


   Last Admin: 06/13/17 08:11 Dose:  1 puff


Vitamin B Complex/Vitamin C (Berocca)  1 tab PO DAILY [facility]


   Last Admin: 06/13/17 09:14 Dose:  1 tab











- Labs


Labs: 


 





 06/13/17 06:52 





 06/13/17 06:52 











- Head Exam


Head Exam: ATRAUMATIC, NORMOCEPHALIC





- Eye Exam


Eye Exam: Normal appearance





- ENT Exam


ENT Exam: Mucous Membranes Moist





- Neck Exam


Neck Exam: Full ROM, Normal Inspection





- Respiratory Exam


Respiratory Exam: Rhonchi, Wheezes





- Cardiovascular Exam


Cardiovascular Exam: REGULAR RHYTHM





- GI/Abdominal Exam


GI & Abdominal Exam: Soft, Normal Bowel Sounds





- Extremities Exam


Extremities Exam: Full ROM, Normal Inspection





- Neurological Exam


Neurological Exam: Alert, Oriented x3





Assessment and Plan


(1) COPD exacerbation


Assessment & Plan: 


Continue IV steroids nebulizer treatment and antibiotics


Status: Acute

## 2017-06-13 NOTE — CP.PCM.PN
Subjective





- Date & Time of Evaluation


Date of Evaluation: 06/13/17


Time of Evaluation: 07:25





- Subjective


Subjective: 





PGY1 Progress Note for Dr. Ennis:





Patient seen and examined.  Patient states he feels better today and that his 

breathing is improved.  Patient denies shortness of breath without supplemental 

oxygen. 





Objective





- Vital Signs/Intake and Output


Vital Signs (last 24 hours): 


 











Temp Pulse Resp BP Pulse Ox


 


 97.5 F L  88   18   126/74   94 L


 


 06/13/17 07:00  06/13/17 09:12  06/13/17 07:00  06/13/17 09:12  06/13/17 07:00








Intake and Output: 


 











 06/13/17 06/13/17





 06:59 18:59


 


Intake Total 120 


 


Balance 120 














- Medications


Medications: 


 Current Medications





Albuterol/Ipratropium (Duoneb 3 Mg/0.5 Mg (3 Ml) Ud)  3 ml INH Q2 PRN


   PRN Reason: SHORTNESS OF BREATH


Albuterol/Ipratropium (Duoneb 3 Mg/0.5 Mg (3 Ml) Ud)  3 ml INH RQ4 [facility]


   Last Admin: 06/13/17 08:11 Dose:  3 ml


Aspirin (Ecotrin)  81 mg PO DAILY [facility]


   Last Admin: 06/13/17 09:14 Dose:  81 mg


Finasteride (Proscar)  5 mg PO DAILY [facility]


   Last Admin: 06/13/17 09:14 Dose:  5 mg


Heparin Sodium (Porcine) (Heparin)  5,000 units SC Q12 [facility]


   Last Admin: 06/13/17 09:34 Dose:  5,000 units


Hydrochlorothiazide (Microzide)  12.5 mg PO DAILY [facility]


   Last Admin: 06/13/17 09:14 Dose:  12.5 mg


Moxifloxacin HCl (Avelox Iv 400mg/250ml Ns)  400 mg in 250 mls @ 167 mls/hr 

IVPB Q24H [facility]


   Last Admin: 06/12/17 18:01 Dose:  167 mls/hr


Lisinopril (Zestril)  20 mg PO DAILY [facility]


   Last Admin: 06/13/17 09:14 Dose:  20 mg


Methylprednisolone (Solu-Medrol)  40 mg IVP Q6H [facility]


   Last Admin: 06/13/17 09:34 Dose:  40 mg


Montelukast Sodium (Singulair)  10 mg PO HS [facility]


   Last Admin: 06/12/17 22:26 Dose:  10 mg


Pantoprazole Sodium (Protonix Ec Tab)  40 mg PO DAILY [facility]


   Last Admin: 06/13/17 09:14 Dose:  40 mg


Rosuvastatin Calcium (Crestor)  10 mg PO HS [facility]


   Last Admin: 06/12/17 22:26 Dose:  10 mg


Fluticasone/Salmeterol (Advair Diskus 250/50)  1 puff IH RQD [facility]


   Last Admin: 06/13/17 08:11 Dose:  1 puff


Vitamin B Complex/Vitamin C (Berocca)  1 tab PO DAILY [facility]


   Last Admin: 06/13/17 09:14 Dose:  1 tab











- Labs


Labs: 


 





 06/13/17 06:52 





 06/13/17 06:52 











- Constitutional


Appears: Non-toxic, No Acute Distress





- Head Exam


Head Exam: ATRAUMATIC, NORMOCEPHALIC





- Eye Exam


Eye Exam: EOMI





- ENT Exam


ENT Exam: Mucous Membranes Moist





- Respiratory Exam


Respiratory Exam: Wheezes (inspiratory and expiratory).  absent: Respiratory 

Distress





- Cardiovascular Exam


Cardiovascular Exam: +S1, +S2





- GI/Abdominal Exam


GI & Abdominal Exam: Soft, Normal Bowel Sounds





- Extremities Exam


Extremities Exam: absent: Pedal Edema





- Neurological Exam


Neurological Exam: Alert, Awake





- Psychiatric Exam


Psychiatric exam: Normal Affect





- Skin


Skin Exam: Warm





Assessment and Plan





- Assessment and Plan (Free Text)


Assessment: 





1.  COPD 


continue IV Avelox 400mg daily 


Duoneb q4H scheduled with q2 prn


continue his advair 250/50


continue Singular 10mg HS


continue Solumedrol 40mg IVP q6h for now, may decrease steroids tomorrow





blood cultures negative for growth for 24h





ED x:ray was shows evidence of COPD, and also a right lower lobe infiltrate


CBC on admission shows a left shift with a high absolute neutrophil count. 





Dr. Costello pulm consult, help appreciated- continue IV steroids, nebulizers, 

antibiotics


ABG: pCO2 30/ pO2 92/ pH 7.46 (on 3L NC)





2.  BPH 


Proscar 5mg daily





3.  HTN


Zestril 20 and HCTZ 12.5mg, monitor vital signs q4h





4.  Hyperlipedmia


Crestor 10mg





5. Prophylatic measure


Heparin 5000 units SC Q12H


SCDs


protonix 40mg


B Vitamin complex.

## 2017-06-14 VITALS — RESPIRATION RATE: 20 BRPM

## 2017-06-14 LAB
ALBUMIN/GLOB SERPL: 1.1 {RATIO} (ref 1–2.1)
ALP SERPL-CCNC: 47 U/L (ref 38–126)
ALT SERPL-CCNC: 18 U/L (ref 21–72)
AST SERPL-CCNC: 18 U/L (ref 17–59)
BASOPHILS # BLD AUTO: 0 K/UL (ref 0–0.2)
BASOPHILS NFR BLD: 0.1 % (ref 0–2)
BILIRUB SERPL-MCNC: 0.6 MG/DL (ref 0.2–1.3)
BUN SERPL-MCNC: 26 MG/DL (ref 9–20)
CALCIUM SERPL-MCNC: 9 MG/DL (ref 8.6–10.4)
CHLORIDE SERPL-SCNC: 104 MMOL/L (ref 98–107)
CO2 SERPL-SCNC: 26 MMOL/L (ref 22–30)
EOSINOPHIL # BLD AUTO: 0 K/UL (ref 0–0.7)
EOSINOPHIL NFR BLD: 0 % (ref 0–4)
ERYTHROCYTE [DISTWIDTH] IN BLOOD BY AUTOMATED COUNT: 13.7 % (ref 11.5–14.5)
GLOBULIN SER-MCNC: 3 GM/DL (ref 2.2–3.9)
GLUCOSE SERPL-MCNC: 132 MG/DL (ref 75–110)
HCT VFR BLD CALC: 38.2 % (ref 35–51)
LYMPHOCYTES # BLD AUTO: 0.7 K/UL (ref 1–4.3)
LYMPHOCYTES NFR BLD AUTO: 5.5 % (ref 20–40)
MCH RBC QN AUTO: 29.7 PG (ref 27–31)
MCHC RBC AUTO-ENTMCNC: 32.8 G/DL (ref 33–37)
MCV RBC AUTO: 90.4 FL (ref 80–94)
MONOCYTES # BLD: 0.6 K/UL (ref 0–0.8)
MONOCYTES NFR BLD: 4.4 % (ref 0–10)
NEUTROPHILS NFR BLD AUTO: 95 % (ref 50–75)
NRBC BLD AUTO-RTO: 0 % (ref 0–2)
PLATELET # BLD: 207 K/UL (ref 130–400)
PMV BLD AUTO: 8.8 FL (ref 7.2–11.7)
POTASSIUM SERPL-SCNC: 3.9 MMOL/L (ref 3.6–5.2)
PROT SERPL-MCNC: 6.4 G/DL (ref 6.3–8.3)
SODIUM SERPL-SCNC: 139 MMOL/L (ref 132–148)
TOTAL CELLS COUNTED BLD: 100
WBC # BLD AUTO: 12.7 K/UL (ref 4.8–10.8)

## 2017-06-14 RX ADMIN — METHYLPREDNISOLONE SODIUM SUCCINATE SCH MG: 40 INJECTION, POWDER, FOR SOLUTION INTRAMUSCULAR; INTRAVENOUS at 04:48

## 2017-06-14 RX ADMIN — IPRATROPIUM BROMIDE AND ALBUTEROL SULFATE SCH ML: .5; 3 SOLUTION RESPIRATORY (INHALATION) at 11:30

## 2017-06-14 RX ADMIN — Medication SCH TAB: at 09:37

## 2017-06-14 RX ADMIN — IPRATROPIUM BROMIDE AND ALBUTEROL SULFATE SCH: .5; 3 SOLUTION RESPIRATORY (INHALATION) at 03:04

## 2017-06-14 RX ADMIN — IPRATROPIUM BROMIDE AND ALBUTEROL SULFATE SCH ML: .5; 3 SOLUTION RESPIRATORY (INHALATION) at 23:24

## 2017-06-14 RX ADMIN — PANTOPRAZOLE SODIUM SCH MG: 40 TABLET, DELAYED RELEASE ORAL at 09:37

## 2017-06-14 RX ADMIN — IPRATROPIUM BROMIDE AND ALBUTEROL SULFATE SCH ML: .5; 3 SOLUTION RESPIRATORY (INHALATION) at 19:39

## 2017-06-14 RX ADMIN — IPRATROPIUM BROMIDE AND ALBUTEROL SULFATE SCH ML: .5; 3 SOLUTION RESPIRATORY (INHALATION) at 08:17

## 2017-06-14 RX ADMIN — METHYLPREDNISOLONE SODIUM SUCCINATE SCH MG: 40 INJECTION, POWDER, FOR SOLUTION INTRAMUSCULAR; INTRAVENOUS at 18:22

## 2017-06-14 RX ADMIN — FLUTICASONE PROPIONATE AND SALMETEROL SCH PUFF: 50; 250 POWDER RESPIRATORY (INHALATION) at 08:17

## 2017-06-14 RX ADMIN — MOXIFLOXACIN HYDROCHLORIDE SCH MLS/HR: 400 INJECTION, SOLUTION INTRAVENOUS at 18:21

## 2017-06-14 RX ADMIN — METHYLPREDNISOLONE SODIUM SUCCINATE SCH MG: 40 INJECTION, POWDER, FOR SOLUTION INTRAMUSCULAR; INTRAVENOUS at 09:38

## 2017-06-14 NOTE — CP.PCM.PN
Subjective





- Date & Time of Evaluation


Date of Evaluation: 06/14/17


Time of Evaluation: 11:20





- Subjective


Subjective: 





Patient seen and examined.


Still complaining of cough but less short of breath


Afebrile


Patient is awake and responsive





Objective





- Vital Signs/Intake and Output


Vital Signs (last 24 hours): 


 











Temp Pulse Resp BP Pulse Ox


 


 97.5 F L  56 L  20   125/64   95 


 


 06/14/17 16:23  06/14/17 16:23  06/14/17 16:23  06/14/17 16:23  06/14/17 16:23








Intake and Output: 


 











 06/14/17 06/14/17





 06:59 18:59


 


Intake Total  400


 


Balance  400














- Medications


Medications: 


 Current Medications





Albuterol/Ipratropium (Duoneb 3 Mg/0.5 Mg (3 Ml) Ud)  3 ml INH Q2 PRN


   PRN Reason: SHORTNESS OF BREATH


Albuterol/Ipratropium (Duoneb 3 Mg/0.5 Mg (3 Ml) Ud)  3 ml INH RQ4 [facility]


   Last Admin: 06/14/17 11:30 Dose:  3 ml


Aspirin (Ecotrin)  81 mg PO DAILY [facility]


   Last Admin: 06/14/17 09:37 Dose:  81 mg


Finasteride (Proscar)  5 mg PO DAILY [facility]


   Last Admin: 06/14/17 09:38 Dose:  5 mg


Heparin Sodium (Porcine) (Heparin)  5,000 units SC Q12 [facility]


   Last Admin: 06/14/17 09:38 Dose:  5,000 units


Hydrochlorothiazide (Microzide)  12.5 mg PO DAILY [facility]


   Last Admin: 06/14/17 09:37 Dose:  12.5 mg


Moxifloxacin HCl (Avelox Iv 400mg/250ml Ns)  400 mg in 250 mls @ 167 mls/hr 

IVPB Q24H [facility]


   Last Admin: 06/13/17 17:23 Dose:  167 mls/hr


Lisinopril (Zestril)  20 mg PO DAILY [facility]


   Last Admin: 06/14/17 09:37 Dose:  20 mg


Methylprednisolone (Solu-Medrol)  40 mg IVP Q8H [facility]


Montelukast Sodium (Singulair)  10 mg PO HS [facility]


   Last Admin: 06/13/17 22:02 Dose:  10 mg


Pantoprazole Sodium (Protonix Ec Tab)  40 mg PO DAILY [facility]


   Last Admin: 06/14/17 09:37 Dose:  40 mg


Rosuvastatin Calcium (Crestor)  10 mg PO HS [facility]


   Last Admin: 06/13/17 22:02 Dose:  10 mg


Fluticasone/Salmeterol (Advair Diskus 250/50)  1 puff IH RQD [facility]


   Last Admin: 06/14/17 08:17 Dose:  1 puff


Vitamin B Complex/Vitamin C (Berocca)  1 tab PO DAILY [facility]


   Last Admin: 06/14/17 09:37 Dose:  1 tab











- Labs


Labs: 


 





 06/14/17 07:27 





 06/14/17 07:27 











- Head Exam


Head Exam: ATRAUMATIC, NORMOCEPHALIC





- Eye Exam


Eye Exam: Normal appearance





- ENT Exam


ENT Exam: Mucous Membranes Moist





- Neck Exam


Neck Exam: Normal Inspection





- Respiratory Exam


Respiratory Exam: Rhonchi, Wheezes





- Cardiovascular Exam


Cardiovascular Exam: REGULAR RHYTHM





- GI/Abdominal Exam


GI & Abdominal Exam: Soft, Normal Bowel Sounds





Assessment and Plan


(1) COPD exacerbation


Assessment & Plan: 


Change IV Solu-Medrol to by mouth prednisone


Continue nebulizer treatment





Status: Acute

## 2017-06-14 NOTE — CP.PCM.PN
<Shwetha Rodriguez DO - Last Filed: 06/14/17 14:09>





Subjective





- Date & Time of Evaluation


Date of Evaluation: 06/14/17


Time of Evaluation: 07:35





- Subjective


Subjective: 





PGY1 progress note for Dr. Rahman





Patient seen and examined.  Patient states he feels better today.  Patient 

states he feels more comfortable while on nasal cannula O2.  





Objective





- Vital Signs/Intake and Output


Vital Signs (last 24 hours): 


 











Temp Pulse Resp BP Pulse Ox


 


 98.0 F   79   18   157/81 H  96 


 


 06/14/17 07:00  06/14/17 09:36  06/14/17 07:00  06/14/17 09:36  06/14/17 07:00











- Medications


Medications: 


 Current Medications





Albuterol/Ipratropium (Duoneb 3 Mg/0.5 Mg (3 Ml) Ud)  3 ml INH Q2 PRN


   PRN Reason: SHORTNESS OF BREATH


Albuterol/Ipratropium (Duoneb 3 Mg/0.5 Mg (3 Ml) Ud)  [facility]


   Last Admin: 06/14/17 11:30 Dose:  3 ml


Aspirin (Ecotrin)  81 mg PO DAILY [facility]


   Last Admin: 06/14/17 09:37 Dose:  81 mg


Finasteride (Proscar)  5 mg PO DAILY [facility]


   Last Admin: 06/14/17 09:38 Dose:  5 mg


Heparin Sodium (Porcine) (Heparin)  5,000 units SC Q12 [facility]


   Last Admin: 06/14/17 09:38 Dose:  5,000 units


Hydrochlorothiazide (Microzide)  12.5 mg PO DAILY [facility]


   Last Admin: 06/14/17 09:37 Dose:  12.5 mg


Moxifloxacin HCl (Avelox Iv 400mg/250ml Ns)  400 mg in 250 mls @ 167 mls/hr 

IVPB Q24H [facility]


   Last Admin: 06/13/17 17:23 Dose:  167 mls/hr


Lisinopril (Zestril)  20 mg PO DAILY [facility]


   Last Admin: 06/14/17 09:37 Dose:  20 mg


Methylprednisolone (Solu-Medrol)  40 mg IVP Q8H [facility]


Montelukast Sodium (Singulair)  10 mg PO HS [facility]


   Last Admin: 06/13/17 22:02 Dose:  10 mg


Pantoprazole Sodium (Protonix Ec Tab)  40 mg PO DAILY [facility]


   Last Admin: 06/14/17 09:37 Dose:  40 mg


Rosuvastatin Calcium (Crestor)  10 mg PO HS [facility]


   Last Admin: 06/13/17 22:02 Dose:  10 mg


Fluticasone/Salmeterol (Advair Diskus 250/50)  1 puff IH RQD [facility]


   Last Admin: 06/14/17 08:17 Dose:  1 puff


Vitamin B Complex/Vitamin C (Berocca)  1 tab PO DAILY [facility]


   Last Admin: 06/14/17 09:37 Dose:  1 tab











- Labs


Labs: 


 





 06/14/17 07:27 





 06/14/17 07:27 











- Constitutional


Appears: No Acute Distress





- Head Exam


Head Exam: ATRAUMATIC, NORMOCEPHALIC





- Eye Exam


Eye Exam: EOMI





- ENT Exam


ENT Exam: Mucous Membranes Moist





- Respiratory Exam


Respiratory Exam: Wheezes (improved from prior examination)





- Cardiovascular Exam


Cardiovascular Exam: +S1, +S2





- GI/Abdominal Exam


GI & Abdominal Exam: Soft, Normal Bowel Sounds.  absent: Tenderness





- Extremities Exam


Extremities Exam: absent: Pedal Edema





- Neurological Exam


Neurological Exam: Alert, Awake





- Psychiatric Exam


Psychiatric exam: Normal Affect





- Skin


Skin Exam: Warm





Assessment and Plan





- Assessment and Plan (Free Text)


Assessment: 





1.  COPD / RLL infiltrate


continue IV Avelox 400mg daily 


Duoneb q4H scheduled with q2 prn


continue his advair 250/50


continue Singular 10mg HS


continue Solumedrol 40mg IVP q8h





blood cultures negative for growth 





ED x:ray was shows evidence of COPD, and also a right lower lobe infiltrate


CBC on admission shows a left shift with a high absolute neutrophil count. 





Dr. Costello pulm consult, help appreciated- continue IV steroids, nebulizers, 

antibiotics


ABG: pCO2 30/ pO2 92/ pH 7.46 (on 3L NC)


repeat ABG tomorrow





2.  BPH 


Proscar 5mg daily





3.  HTN


Zestril 20 and HCTZ 12.5mg, monitor vital signs q4h





4.  Hyperlipedmia


Crestor 10mg





5. Prophylatic measure


Heparin 5000 units SC Q12H


SCDs


protonix 40mg


B Vitamin complex. 


PT eval for desat on exertion





<Maricruz Rahman V - Last Filed: 06/14/17 18:23>





Objective





- Vital Signs/Intake and Output


Vital Signs (last 24 hours): 


 











Temp Pulse Resp BP Pulse Ox


 


 97.5 F L  56 L  20   125/64   95 


 


 06/14/17 16:23  06/14/17 16:23  06/14/17 16:23  06/14/17 16:23  06/14/17 16:23








Intake and Output: 


 











 06/14/17 06/14/17





 06:59 18:59


 


Intake Total  400


 


Balance  400














- Medications


Medications: 


 Current Medications





Albuterol/Ipratropium (Duoneb 3 Mg/0.5 Mg (3 Ml) Ud)  3 ml INH Q2 PRN


   PRN Reason: SHORTNESS OF BREATH


Albuterol/Ipratropium (Duoneb 3 Mg/0.5 Mg (3 Ml) Ud)  3 ml INH RQ4 [facility]


   Last Admin: 06/14/17 11:30 Dose:  3 ml


Aspirin (Ecotrin)  81 mg PO DAILY [facility]


   Last Admin: 06/14/17 09:37 Dose:  81 mg


Finasteride (Proscar)  5 mg PO DAILY [facility]


   Last Admin: 06/14/17 09:38 Dose:  5 mg


Heparin Sodium (Porcine) (Heparin)  5,000 units SC Q12 [facility]


   Last Admin: 06/14/17 09:38 Dose:  5,000 units


Hydrochlorothiazide (Microzide)  12.5 mg PO DAILY [facility]


   Last Admin: 06/14/17 09:37 Dose:  12.5 mg


Moxifloxacin HCl (Avelox Iv 400mg/250ml Ns)  400 mg in 250 mls @ 167 mls/hr 

IVPB Q24H [facility]


   Last Admin: 06/13/17 17:23 Dose:  167 mls/hr


Lisinopril (Zestril)  20 mg PO DAILY [facility]


   Last Admin: 06/14/17 09:37 Dose:  20 mg


Methylprednisolone (Solu-Medrol)  40 mg IVP Q8H [facility]


Montelukast Sodium (Singulair)  10 mg PO HS [facility]


   Last Admin: 06/13/17 22:02 Dose:  10 mg


Pantoprazole Sodium (Protonix Ec Tab)  40 mg PO DAILY [facility]


   Last Admin: 06/14/17 09:37 Dose:  40 mg


Rosuvastatin Calcium (Crestor)  10 mg PO HS [facility]


   Last Admin: 06/13/17 22:02 Dose:  10 mg


Fluticasone/Salmeterol (Advair Diskus 250/50)  1 puff IH RQD [facility]


   Last Admin: 06/14/17 08:17 Dose:  1 puff


Vitamin B Complex/Vitamin C (Berocca)  1 tab PO DAILY [facility]


   Last Admin: 06/14/17 09:37 Dose:  1 tab











- Labs


Labs: 


 





 06/14/17 07:27 





 06/14/17 07:27 











Attending/Attestation





- Attestation


I have personally seen and examined this patient.: Yes


I have fully participated in the care of the patient.: Yes


I have reviewed all pertinent clinical information, including history, physical 

exam and plan: Yes


Notes (Text): 


Patient seen, examined, and case discussed with day-time resident.


Patient reports breathing is improved compared to yesterday. Tapered Iv 

steroid. Will ask PT to assess patient for desaturation.





Assessment/Plan


1.  COPD exacerbation


* Pulmonary (Dr. Costello) on board-->help appreciated


* continue IV Avelox 400mg daily (active since 6/12/17)


* Duoneb q4H scheduled with q2 prn


* c/w advair 250/50 1 puff Q 12hours


* c/w Singular 10mg HS


* Taper Solumedrol 40mg IVP q8h


* Blood cultures (6/11/17): no growth for 48 hours X2


* Chest xray (6/11/17): diffuse increased interstital lung markings. Biapical 

pleural thickening with upper lobe granulomatous changes. Left hilar 

prominence. Patchy consolidation with the right infrahilar region and right 

lung base





2.  BPH 


* Proscar 5mg daily





3.  Hypertension


* Zestril 20mg PO daily


* HCTZ 12.5mg PO daily


* Monitor vital signs





4.  Hyperlipidmia


* Crestor 10mg PO qHS





5. Prophylatic measure


* Heparin 5000 units SC Q12H


* SCDs


* protonix 40mg PO daily


* PT eval for desat on exertion

## 2017-06-15 VITALS — HEART RATE: 67 BPM | SYSTOLIC BLOOD PRESSURE: 150 MMHG | OXYGEN SATURATION: 96 % | DIASTOLIC BLOOD PRESSURE: 80 MMHG

## 2017-06-15 VITALS — TEMPERATURE: 97.5 F

## 2017-06-15 LAB
ALBUMIN/GLOB SERPL: 1.2 {RATIO} (ref 1–2.1)
ALP SERPL-CCNC: 49 U/L (ref 38–126)
ALT SERPL-CCNC: 12 U/L (ref 21–72)
ARTERIAL PATENCY WRIST A: (no result)
AST SERPL-CCNC: 17 U/L (ref 17–59)
BASOPHILS # BLD AUTO: 0 K/UL (ref 0–0.2)
BASOPHILS NFR BLD: 0.2 % (ref 0–2)
BILIRUB SERPL-MCNC: 0.6 MG/DL (ref 0.2–1.3)
BUN SERPL-MCNC: 25 MG/DL (ref 9–20)
CALCIUM SERPL-MCNC: 9 MG/DL (ref 8.6–10.4)
CHLORIDE SERPL-SCNC: 100 MMOL/L (ref 98–107)
CO2 SERPL-SCNC: 25 MMOL/L (ref 22–30)
COHGB MFR BLD: 1.4 % (ref 0.5–1.5)
DRAW SITE: (no result)
EOSINOPHIL # BLD AUTO: 0 K/UL (ref 0–0.7)
EOSINOPHIL NFR BLD: 0 % (ref 0–4)
ERYTHROCYTE [DISTWIDTH] IN BLOOD BY AUTOMATED COUNT: 13.9 % (ref 11.5–14.5)
GLOBULIN SER-MCNC: 3 GM/DL (ref 2.2–3.9)
GLUCOSE SERPL-MCNC: 127 MG/DL (ref 75–110)
HCT VFR BLD CALC: 39.3 % (ref 35–51)
HHB: 2.8 % (ref 0–5)
LYMPHOCYTES # BLD AUTO: 0.8 K/UL (ref 1–4.3)
LYMPHOCYTES NFR BLD AUTO: 7.7 % (ref 20–40)
MCH RBC QN AUTO: 30.1 PG (ref 27–31)
MCHC RBC AUTO-ENTMCNC: 33.4 G/DL (ref 33–37)
MCV RBC AUTO: 90.1 FL (ref 80–94)
METHGB MFR BLD: 1.1 % (ref 0–3)
MONOCYTES # BLD: 0.6 K/UL (ref 0–0.8)
MONOCYTES NFR BLD: 5.3 % (ref 0–10)
NEUTROPHILS NFR BLD AUTO: 84 % (ref 50–75)
NRBC BLD AUTO-RTO: 0.2 % (ref 0–2)
PLATELET # BLD: 211 K/UL (ref 130–400)
PMV BLD AUTO: 9.1 FL (ref 7.2–11.7)
POTASSIUM SERPL-SCNC: 4 MMOL/L (ref 3.6–5.2)
PROT SERPL-MCNC: 6.5 G/DL (ref 6.3–8.3)
SAO2 % BLDA: 94.6 % (ref 95–98)
SODIUM SERPL-SCNC: 136 MMOL/L (ref 132–148)
TOTAL CELLS COUNTED BLD: 100
WBC # BLD AUTO: 11 K/UL (ref 4.8–10.8)

## 2017-06-15 RX ADMIN — IPRATROPIUM BROMIDE AND ALBUTEROL SULFATE SCH ML: .5; 3 SOLUTION RESPIRATORY (INHALATION) at 03:08

## 2017-06-15 RX ADMIN — IPRATROPIUM BROMIDE AND ALBUTEROL SULFATE SCH: .5; 3 SOLUTION RESPIRATORY (INHALATION) at 16:54

## 2017-06-15 RX ADMIN — METHYLPREDNISOLONE SODIUM SUCCINATE SCH MG: 40 INJECTION, POWDER, FOR SOLUTION INTRAMUSCULAR; INTRAVENOUS at 01:28

## 2017-06-15 RX ADMIN — FLUTICASONE PROPIONATE AND SALMETEROL SCH PUFF: 50; 250 POWDER RESPIRATORY (INHALATION) at 07:54

## 2017-06-15 RX ADMIN — IPRATROPIUM BROMIDE AND ALBUTEROL SULFATE SCH: .5; 3 SOLUTION RESPIRATORY (INHALATION) at 20:46

## 2017-06-15 RX ADMIN — PANTOPRAZOLE SODIUM SCH MG: 40 TABLET, DELAYED RELEASE ORAL at 10:29

## 2017-06-15 RX ADMIN — IPRATROPIUM BROMIDE AND ALBUTEROL SULFATE SCH ML: .5; 3 SOLUTION RESPIRATORY (INHALATION) at 07:54

## 2017-06-15 RX ADMIN — IPRATROPIUM BROMIDE AND ALBUTEROL SULFATE SCH ML: .5; 3 SOLUTION RESPIRATORY (INHALATION) at 11:15

## 2017-06-15 RX ADMIN — MOXIFLOXACIN HYDROCHLORIDE SCH MLS/HR: 400 INJECTION, SOLUTION INTRAVENOUS at 18:08

## 2017-06-15 RX ADMIN — METHYLPREDNISOLONE SODIUM SUCCINATE SCH MG: 40 INJECTION, POWDER, FOR SOLUTION INTRAMUSCULAR; INTRAVENOUS at 10:28

## 2017-06-15 RX ADMIN — Medication SCH TAB: at 10:29

## 2017-06-15 NOTE — CP.PCM.DIS
Provider





- Provider


Date of Admission: 


06/11/17 15:11





Attending physician: 


Maricruz Rahman DO





Primary care physician: 





Isac


Consults: 





Dr. Costello


Time Spent in preparation of Discharge (in minutes): 35





Diagnosis





- Discharge Diagnosis


(1) COPD with asthma


Status: Acute   


Comment: Patient treated with IV steroids and nebulizers.  Patient will need 

medrol dose pack for steroid taper.   





(2) Pneumonia


Status: Acute   


Comment: Patient treated with avelox for four days while inpatient.  Will need 

one more day of PO medication.   





Hospital Course





- Lab Results


Lab Results: 


 Most Recent Lab Values











WBC  11.0 K/uL (4.8-10.8)  H  06/15/17  06:29    


 


RBC  4.37 Mil/uL (4.40-5.90)  L  06/15/17  06:29    


 


Hgb  13.1 g/dL (12.0-18.0)   06/15/17  06:29    


 


Hct  39.3 % (35.0-51.0)   06/15/17  06:29    


 


MCV  90.1 fL (80.0-94.0)   06/15/17  06:29    


 


MCH  30.1 pg (27.0-31.0)   06/15/17  06:29    


 


MCHC  33.4 g/dL (33.0-37.0)   06/15/17  06:29    


 


RDW  13.9 % (11.5-14.5)   06/15/17  06:29    


 


Plt Count  211 K/uL (130-400)   06/15/17  06:29    


 


MPV  9.1 fL (7.2-11.7)   06/15/17  06:29    


 


Neut % (Auto)  86.8 % (50.0-75.0)  H  06/15/17  06:29    


 


Lymph % (Auto)  7.7 % (20.0-40.0)  L  06/15/17  06:29    


 


Mono % (Auto)  5.3 % (0.0-10.0)   06/15/17  06:29    


 


Eos % (Auto)  0.0 % (0.0-4.0)   06/15/17  06:29    


 


Baso % (Auto)  0.2 % (0.0-2.0)   06/15/17  06:29    


 


Neut #  9.5 K/uL (1.8-7.0)  H  06/15/17  06:29    


 


Lymph #  0.8 K/uL (1.0-4.3)  L  06/15/17  06:29    


 


Mono #  0.6 K/uL (0.0-0.8)   06/15/17  06:29    


 


Eos #  0.0 K/uL (0.0-0.7)   06/15/17  06:29    


 


Baso #  0.0 K/uL (0.0-0.2)   06/15/17  06:29    


 


Neutrophils % (Manual)  84 % (50-75)  H  06/15/17  06:29    


 


Band Neutrophils %  3 % (0-2)  H  06/15/17  06:29    


 


Lymphocytes % (Manual)  9 % (20-40)  L  06/15/17  06:29    


 


Monocytes % (Manual)  4 % (0-10)   06/15/17  06:29    


 


Eosinophils % (Manual)  6 % (0-4)  H  06/11/17  11:15    


 


Toxic Granulation  Present   06/11/17  11:15    


 


Platelet Estimate  Normal  (NORMAL)   06/15/17  06:29    


 


Large Platelets  Present   06/11/17  11:15    


 


RBC Morphology  Normal   06/14/17  07:27    


 


Polychromasia  Slight   06/11/17  11:15    


 


Hypochromasia (manual)  Slight   06/11/17  11:15    


 


Poikilocytosis (manual  Slight   06/11/17  11:15    


 


Anisocytosis (manual)  Slight   06/11/17  11:15    


 


Ovalocytes  Slight   06/15/17  06:29    


 


Puncture Site  Ra   06/15/17  10:46    


 


pCO2  36 mm/Hg (35-45)   06/15/17  10:46    


 


pO2  73 mm/Hg ()  L  06/15/17  10:46    


 


HCO3  26.9 mmol/L (21-28)   06/15/17  10:46    


 


ABG pH  7.47  (7.35-7.45)  H  06/15/17  10:46    


 


ABG Total CO2  27.3 mmol/L (22-28)   06/15/17  10:46    


 


ABG O2 Saturation  97.1 % (95-98)   06/15/17  10:46    


 


ABG Base Excess  2.6 mmol/L (-2.0-3.0)   06/15/17  10:46    


 


ABG Hemoglobin  12.5 g/dL (11.7-17.4)   06/15/17  10:46    


 


ABG Carboxyhemoglobin  1.4 % (0.5-1.5)   06/15/17  10:46    


 


POC ABG HHb (Measured)  2.8 % (0.0-5.0)   06/15/17  10:46    


 


ABG Methemoglobin  1.1 % (0.0-3.0)   06/15/17  10:46    


 


Rolan Test  Po   06/15/17  10:46    


 


A-a O2 Difference  32.0 mm/Hg  06/15/17  10:46    


 


Respiratory Index  0.4   06/15/17  10:46    


 


Hgb O2 Saturation  94.6 % (95.0-98.0)  L  06/15/17  10:46    


 


Liter Flow  3.0   06/12/17  08:09    


 


FiO2  21.0 %  06/15/17  10:46    


 


Sodium  136 mmol/L (132-148)   06/15/17  06:29    


 


Potassium  4.0 mmol/L (3.6-5.2)   06/15/17  06:29    


 


Chloride  100 mmol/L ()   06/15/17  06:29    


 


Carbon Dioxide  25 mmol/L (22-30)   06/15/17  06:29    


 


Anion Gap  15  (10-20)   06/15/17  06:29    


 


BUN  25 mg/dL (9-20)  H  06/15/17  06:29    


 


Creatinine  0.8 MG/DL (0.8-1.5)   06/15/17  06:29    


 


Est GFR ( Amer)  > 60   06/15/17  06:29    


 


Est GFR (Non-Af Amer)  > 60   06/15/17  06:29    


 


Random Glucose  127 mg/dL ()  H  06/15/17  06:29    


 


Calcium  9.0 mg/dl (8.6-10.4)   06/15/17  06:29    


 


Phosphorus  3.4 mg/dL (2.5-4.5)   06/12/17  06:52    


 


Magnesium  2.1 mg/dL (1.6-2.3)   06/12/17  06:52    


 


Total Bilirubin  0.6 mg/dL (0.2-1.3)   06/15/17  06:29    


 


AST  17 U/L (17-59)   06/15/17  06:29    


 


ALT  12 U/L (21-72)  L D 06/15/17  06:29    


 


Alkaline Phosphatase  49 U/L ()   06/15/17  06:29    


 


Total Creatine Kinase  53 U/L ()  L  06/11/17  11:15    


 


CK-MB (Mass)  0.99 ng/mL (0.0-3.38)   06/11/17  11:15    


 


Troponin I  < 0.0120 ng/mL (0.00-0.120)   06/11/17  11:15    


 


NT-Pro-B Natriuret Pep  109 pg/mL (0-900)   06/11/17  11:15    


 


Total Protein  6.5 g/dL (6.3-8.3)   06/15/17  06:29    


 


Albumin  3.5 g/dL (3.5-5.0)   06/15/17  06:29    


 


Globulin  3.0 gm/dL (2.2-3.9)   06/15/17  06:29    


 


Albumin/Globulin Ratio  1.2  (1.0-2.1)   06/15/17  06:29    














- Hospital Course


Hospital Course: 





On Admission:


Patient is a 77 year old male with a history of COPD, asthma, HTN, and 

hyperlipedmia.  He is here because of 3 days of worsening cough, wheezing, 

dyspnea, and some mild chest tightness and discomfort.  He also says he has 

felt some dizziness and feeling light headed as well but denies any room 

spining sensation or hearing loss.  He also denies fever, chills, palpitations, 

night sweats, recent weight changes, nausea, vomiting, diarrhea, heart burn, 

acid taste, dysuria, anxiety, depression, numbness, weakness, muscle weakness, 

joint pain or swelling.  He also reports taking the medication has prescribed.  





During hospitalization:


Patient was seen by pulmonologist Dr. Costello.  Patient was placed on IV steroids, 

nebulizers, and avelox to cover for pneumonia. 





Chest xray (6/11/17): diffuse increased interstital lung markings. Biapical 

pleural thickening with upper lobe granulomatous changes. Left hilar 

prominence. Patchy consolidation with the right infrahilar region and right 

lung base





ABG: pCO2 30/ pO2 92/ pH 7.46 (on 3L NC)


repeat ABG today off of supplemental O2: pCO2 36/ pO2 73/ pH 7.47





Patient had improvement of symptoms and was stable for discharge.





On Discharge:


Patient is stable for discharge home per Dr. Rahman.  Patient is to follow up 

with Dr. Costello within one week of discharge.  Patient is to resume home 

medications.  Patient is being discharged with avelox for one more day, medrol 

dose pack, albuterol inhaler, advair diskus.  Patient is to make appointment to 

follow up with Dr. Chau on discharge.  Patient is to return to the ED if his 

symptoms reoccur or worsen.  This was explained to the patient who understands 

and agrees. 





This is only a summary of patient's hospitalization, for more detail please see 

complete record.





Discharge Exam





- Head Exam


Head Exam: ATRAUMATIC





- Eye Exam


Eye Exam: EOMI





- ENT Exam


ENT Exam: Mucous Membranes Moist





- Respiratory Exam


Respiratory Exam: Wheezes (improved from prior exam)


Additional comments: 





Walked patient around nursing unit without supplemental oxygen with O2 sat 

reading of 93%





- Cardiovascular Exam


Cardiovascular Exam: +S1, +S2





- GI/Abdominal Exam


GI & Abdominal Exam: Normal Bowel Sounds, Soft.  absent: Tenderness





- Extremities Exam


Extremities exam: normal inspection





- Neurological Exam


Neurological exam: Alert





- Psychiatric Exam


Psychiatric exam: Normal Affect





- Skin


Skin Exam: Warm





Discharge Plan





- Discharge Medications


Prescriptions: 


Albuterol Sulfate [Proair Hfa] 0.09 mg IH Q6 PRN #1 inhaler


 PRN Reason: Shortness Of Breath


Fluticasone/Salmeterol 250/50 [Advair Diskus 250/50] 1 dsk IH DAILY #1 inhaler


Methylprednisolone [Medrol Dose Pack (21 tabs)] 4 mg PO DAILY #21 mg


Moxifloxacin [Avelox] 400 mg PO DAILY #1 tab





- Follow Up Plan


Condition: FAIR


Disposition: HOME/ ROUTINE


Additional Instructions: 


Patient is stable for discharge home per Dr. Rahman.  Patient is to follow up 

with Dr. Costello within one week of discharge.  Patient is to resume home 

medications.  Patient is being discharged with avelox for one more day, medrol 

dose pack, albuterol inhaler, advair diskus.  Patient is to make appointment to 

follow up with Dr. Chau on discharge.  Patient is to return to the ED if his 

symptoms reoccur or worsen.  This was explained to the patient who understands 

and agrees. 


Referrals: 


Amrik Costello MD [Staff Provider] -

## 2017-06-15 NOTE — CP.PCM.PN
Subjective





- Date & Time of Evaluation


Date of Evaluation: 06/15/17


Time of Evaluation: 07:25





- Subjective


Subjective: 





PGY1 progress note for Dr. Rahman





Patient seen and examined.  Patient states his breathing has improved but he 

still feels more comfortable on supplemental O2.





Objective





- Vital Signs/Intake and Output


Vital Signs (last 24 hours): 


 











Temp Pulse Resp BP Pulse Ox


 


 97.5 F L  67   20   150/80   96 


 


 06/15/17 15:46  06/15/17 15:46  06/15/17 15:46  06/15/17 15:46  06/15/17 15:46








Intake and Output: 


 











 06/15/17 06/15/17





 06:59 18:59


 


Intake Total  500


 


Balance  500














- Medications


Medications: 


 Current Medications





Albuterol/Ipratropium (Duoneb 3 Mg/0.5 Mg (3 Ml) Ud)  3 ml INH Q2 PRN


   PRN Reason: SHORTNESS OF BREATH


Albuterol/Ipratropium (Duoneb 3 Mg/0.5 Mg (3 Ml) Ud)  3 ml INH RQ4 [facility]


   Last Admin: 06/15/17 11:15 Dose:  3 ml


Aspirin (Ecotrin)  81 mg PO DAILY [facility]


   Last Admin: 06/15/17 10:29 Dose:  81 mg


Finasteride (Proscar)  5 mg PO DAILY [facility]


   Last Admin: 06/15/17 10:29 Dose:  5 mg


Heparin Sodium (Porcine) (Heparin)  5,000 units SC Q12 [facility]


   Last Admin: 06/15/17 10:28 Dose:  5,000 units


Hydrochlorothiazide (Microzide)  12.5 mg PO DAILY [facility]


   Last Admin: 06/15/17 10:29 Dose:  12.5 mg


Moxifloxacin HCl (Avelox Iv 400mg/250ml Ns)  400 mg in 250 mls @ 167 mls/hr 

IVPB Q24H [facility]


   Last Admin: 06/14/17 18:21 Dose:  167 mls/hr


Lisinopril (Zestril)  20 mg PO DAILY [facility]


   Last Admin: 06/15/17 10:29 Dose:  20 mg


Methylprednisolone (Solu-Medrol)  40 mg IVP Q12H [facility]


Montelukast Sodium (Singulair)  10 mg PO HS [facility]


   Last Admin: 06/14/17 22:49 Dose:  10 mg


Pantoprazole Sodium (Protonix Ec Tab)  40 mg PO DAILY [facility]


   Last Admin: 06/15/17 10:29 Dose:  40 mg


Rosuvastatin Calcium (Crestor)  10 mg PO HS [facility]


   Last Admin: 06/14/17 22:49 Dose:  10 mg


Fluticasone/Salmeterol (Advair Diskus 250/50)  1 puff IH RQD [facility]


   Last Admin: 06/15/17 07:54 Dose:  1 puff


Vitamin B Complex/Vitamin C (Berocca)  1 tab PO DAILY [facility]


   Last Admin: 06/15/17 10:29 Dose:  1 tab











- Labs


Labs: 


 





 06/15/17 06:29 





 06/15/17 06:29 











- Constitutional


Appears: Non-toxic, No Acute Distress





- Head Exam


Head Exam: ATRAUMATIC





- Eye Exam


Eye Exam: EOMI





- ENT Exam


ENT Exam: Mucous Membranes Moist





- Respiratory Exam


Respiratory Exam: Wheezes (inspiratory and expiratory, improved from prior exam)





- Cardiovascular Exam


Cardiovascular Exam: +S1, +S2





- GI/Abdominal Exam


GI & Abdominal Exam: Soft, Normal Bowel Sounds.  absent: Tenderness





- Extremities Exam


Extremities Exam: absent: Pedal Edema





- Neurological Exam


Neurological Exam: Alert, Awake





- Psychiatric Exam


Psychiatric exam: Normal Affect





- Skin


Skin Exam: Warm





Assessment and Plan





- Assessment and Plan (Free Text)


Assessment: 





1.  COPD / RLL infiltrate


continue IV Avelox 400mg daily 


Duoneb q4H scheduled with q2 prn


continue his advair 250/50


continue Singular 10mg HS


decrease Solumedrol to 40mg IVP q12h





blood cultures negative for growth 





ED x:ray was shows evidence of COPD, and also a right lower lobe infiltrate


CBC on admission shows a left shift with a high absolute neutrophil count. 





Dr. Costello pulm consult, help appreciated- recommend PO steroids and inhaled 

steroids


ABG: pCO2 30/ pO2 92/ pH 7.46 (on 3L NC)


repeat ABG today off of supplemental O2: pCO2 36/ pO2 73/ pH 7.47





patient worked with PT today, desaturated to 86% while ambulating without 

supplemental O2, 93% while ambulating on supplemental O2








2.  BPH 


Proscar 5mg daily





3.  HTN


Zestril 20 and HCTZ 12.5mg, monitor vital signs q4h





4.  Hyperlipedmia


Crestor 10mg





5. Prophylatic measure


Heparin 5000 units SC Q12H


SCDs


protonix 40mg


B Vitamin complex.

## 2017-06-15 NOTE — CP.PCM.PN
Subjective





- Date & Time of Evaluation


Date of Evaluation: 06/15/17


Time of Evaluation: 09:45





- Subjective


Subjective: 





patient seen and examined.


Lying comfortably in no acute distress


Breathing much improved, slight cough





Objective





- Vital Signs/Intake and Output


Vital Signs (last 24 hours): 


 











Temp Pulse Resp BP Pulse Ox


 


 97.5 F L  56 L  20   148/82   96 


 


 06/15/17 07:00  06/15/17 10:27  06/15/17 07:00  06/15/17 10:27  06/15/17 07:00











- Medications


Medications: 


 Current Medications





Albuterol/Ipratropium (Duoneb 3 Mg/0.5 Mg (3 Ml) Ud)  3 ml INH Q2 PRN


   PRN Reason: SHORTNESS OF BREATH


Albuterol/Ipratropium (Duoneb 3 Mg/0.5 Mg (3 Ml) Ud)  3 ml INH RQ4 [facility]


   Last Admin: 06/15/17 07:54 Dose:  3 ml


Aspirin (Ecotrin)  81 mg PO DAILY [facility]


   Last Admin: 06/15/17 10:29 Dose:  81 mg


Finasteride (Proscar)  5 mg PO DAILY [facility]


   Last Admin: 06/15/17 10:29 Dose:  5 mg


Heparin Sodium (Porcine) (Heparin)  5,000 units SC Q12 [facility]


   Last Admin: 06/15/17 10:28 Dose:  5,000 units


Hydrochlorothiazide (Microzide)  12.5 mg PO DAILY [facility]


   Last Admin: 06/15/17 10:29 Dose:  12.5 mg


Moxifloxacin HCl (Avelox Iv 400mg/250ml Ns)  400 mg in 250 mls @ 167 mls/hr 

IVPB Q24H [facility]


   Last Admin: 06/14/17 18:21 Dose:  167 mls/hr


Lisinopril (Zestril)  20 mg PO DAILY [facility]


   Last Admin: 06/15/17 10:29 Dose:  20 mg


Methylprednisolone (Solu-Medrol)  40 mg IVP Q12H [facility]


Montelukast Sodium (Singulair)  10 mg PO HS [facility]


   Last Admin: 06/14/17 22:49 Dose:  10 mg


Pantoprazole Sodium (Protonix Ec Tab)  40 mg PO DAILY [facility]


   Last Admin: 06/15/17 10:29 Dose:  40 mg


Rosuvastatin Calcium (Crestor)  10 mg PO HS [facility]


   Last Admin: 06/14/17 22:49 Dose:  10 mg


Fluticasone/Salmeterol (Advair Diskus 250/50)  1 puff IH RQD [facility]


   Last Admin: 06/15/17 07:54 Dose:  1 puff


Vitamin B Complex/Vitamin C (Berocca)  1 tab PO DAILY [facility]


   Last Admin: 06/15/17 10:29 Dose:  1 tab











- Labs


Labs: 


 





 06/15/17 06:29 





 06/15/17 06:29 











- Head Exam


Head Exam: ATRAUMATIC, NORMOCEPHALIC





- Eye Exam


Eye Exam: Normal appearance





- ENT Exam


ENT Exam: Mucous Membranes Moist





- Neck Exam


Neck Exam: Normal Inspection





- Respiratory Exam


Respiratory Exam: Rhonchi, Wheezes





- Cardiovascular Exam


Cardiovascular Exam: REGULAR RHYTHM





- GI/Abdominal Exam


GI & Abdominal Exam: Soft, Normal Bowel Sounds





- Extremities Exam


Extremities Exam: Normal Inspection





- Neurological Exam


Neurological Exam: Alert, Oriented x3





Assessment and Plan


(1) COPD exacerbation


Assessment & Plan: 


continue nebulizer treatment


Discharge home on p.o.  prednisone and inhaled steroids





Status: Acute

## 2017-09-11 ENCOUNTER — HOSPITAL ENCOUNTER (EMERGENCY)
Dept: HOSPITAL 31 - C.ER | Age: 77
Discharge: HOME | End: 2017-09-11
Payer: MEDICARE

## 2017-09-11 VITALS
DIASTOLIC BLOOD PRESSURE: 69 MMHG | RESPIRATION RATE: 18 BRPM | SYSTOLIC BLOOD PRESSURE: 110 MMHG | OXYGEN SATURATION: 98 % | HEART RATE: 62 BPM

## 2017-09-11 VITALS — BODY MASS INDEX: 29.9 KG/M2

## 2017-09-11 VITALS — TEMPERATURE: 98.1 F

## 2017-09-11 DIAGNOSIS — K59.00: Primary | ICD-10-CM

## 2017-09-11 NOTE — RAD
PROCEDURE:  Radiographs of the chest and abdomen (obstructive series)



HISTORY:

Constipation  



COMPARISON:

No prior.



TECHNIQUE:

AP radiograph of the chest, with upright and supine radiographs of 

the abdomen.



FINDINGS:



CHEST:

Lungs: Fibrotic minimal changes are seen at the right lung base 

laterally once again. No acute infiltrate bilaterally.



Cardiovascular: Normal size heart. No pulmonary vascular congestion.



Pleura: No pleural fluid. No pneumothorax.



Other findings: None.



ABDOMEN AND PELVIS:

Bowel: Unremarkable bowel gas pattern. No evidence of mechanical 

obstruction.



Free air: None.



Bones:  A dextroscoliotic thoracolumbar spinal deformity is 

appreciated multilevel lumbar spondylosis. Diffuse osteopenia 

suggests osteoporosis.



Other findings: None.



IMPRESSION:

No acute chest findings. No evidence of mechanical bowel obstruction. 

No free intrarenal gas. Scoliotic thoracolumbar spinal deformity 

noted.

## 2017-09-11 NOTE — C.PDOC
History Of Present Illness


Pt c/o constipation. 


Time Seen by Provider: 09/11/17 10:29


Chief Complaint (Nursing): GI Problem


History Per: Patient


Onset/Duration Of Symptoms: Days (4)


Current Symptoms Are (Timing): Still Present


Severity: Moderate


Quality Of Discomfort: Unable To Describe


Associated Symptoms: Constipation


Alleviating Factors: None


Last Bowel Movement: Days Ago (4)


Additional History Per: Prior Records





Past Medical History


Reviewed: Historical Data, Nursing Documentation, Vital Signs


Vital Signs: 


 Last Vital Signs











Temp  98.1 F   09/11/17 10:15


 


Pulse  66   09/11/17 10:15


 


Resp  20   09/11/17 10:15


 


BP  105/65   09/11/17 10:15


 


Pulse Ox  97   09/11/17 12:18














- Medical History


PMH: Anxiety, Asthma, Benign Prostatic Hyperplasia, COPD, Depression, Gastritis

, HTN, Hypercholesterolemia


Surgical History: Appendectomy





- [facility] Procedures








INJECT/INFUSE NEC (03/19/14)


SUPPLEMENT ABDOMINAL WALL WITH SYNTH SUB, PERC ENDO APPROACH (10/29/15)


VACCINATION NEC (06/02/13)








Family History: States: Unknown Family Hx





- Social History


Hx Tobacco Use: No


Hx Alcohol Use: No


Hx Substance Use: No





- Immunization History


Hx Tetanus Toxoid Vaccination: Yes


Hx Influenza Vaccination: Yes


Hx Pneumococcal Vaccination: Yes





Review Of Systems


Except As Marked, All Systems Reviewed And Found Negative.


Constitutional: Negative for: Fever


Cardiovascular: Negative for: Chest Pain


Respiratory: Negative for: Shortness of Breath


Gastrointestinal: Positive for: Constipation.  Negative for: Vomiting, Melena, 

Hematochezia, Hematemesis


Genitourinary: Negative for: Dysuria


Musculoskeletal: Negative for: Neck Pain, Back Pain


Skin: Negative for: Rash


Neurological: Negative for: Weakness, Numbness





Physical Exam





- Physical Exam


Appears: Non-toxic, No Acute Distress


Skin: Normal Color, Warm, Dry


Head: Atraumatic, Normacephalic


Eye(s): bilateral: PERRL, EOMI


Neck: Normal ROM, Supple


Cardiovascular: Rhythm Regular


Respiratory: Normal Breath Sounds, No Accessory Muscle Use


Gastrointestinal/Abdominal: Soft, No Tenderness, No Distention


Rectal: Hemorrhoids, Other (No fecal impaction)


Back: No CVA Tenderness


Extremity: Normal ROM


Neurological/Psych: Oriented x3, Normal Motor, Normal Sensation





ED Course And Treatment


O2 Sat by Pulse Oximetry: 97


Pulse Ox Interpretation: Normal





- Other Rad


  ** Obstructive series


X-Ray: Viewed By Me, Read By Radiologist


Interpretation: IMPRESSION:  No acute chest findings. No evidence of mechanical 

bowel obstruction. No free intrarenal gas. Scoliotic thoracolumbar spinal 

deformity noted.


Progress Note: After Fleet enema, pt had a BM in the ED with relief of symptoms.


Reassessment Condition: Improved





Disposition


Counseled Patient/Family Regarding: Studies Performed, Diagnosis, Need For 

Followup, Rx Given





- Disposition


Referrals: 


Jarad Trent MD [Staff Provider] - 


Disposition: HOME/ ROUTINE


Disposition Time: 12:17


Condition: IMPROVED


Additional Instructions: 


Follow up with your doctor for further evaluation and treatment. Return to the 

ER if you develop fever, vomiting, abdominal pain, worsening of symptoms or if 

you have any other concerns. 


Prescriptions: 


Polyethylene Glycol 3350 [Miralax] 17 gm PO DAILY #7 packet


Instructions:  Constipation (ED)


Forms:  [facility] (Algerian)


Print Language: Welsh





- Clinical Impression


Clinical Impression: 


 Constipation

## 2017-12-12 ENCOUNTER — HOSPITAL ENCOUNTER (EMERGENCY)
Dept: HOSPITAL 31 - C.ER | Age: 77
Discharge: HOME | End: 2017-12-12
Payer: MEDICARE

## 2017-12-12 VITALS — RESPIRATION RATE: 20 BRPM | TEMPERATURE: 97.9 F

## 2017-12-12 VITALS — DIASTOLIC BLOOD PRESSURE: 62 MMHG | OXYGEN SATURATION: 96 % | SYSTOLIC BLOOD PRESSURE: 136 MMHG | HEART RATE: 56 BPM

## 2017-12-12 VITALS — BODY MASS INDEX: 19.8 KG/M2

## 2017-12-12 DIAGNOSIS — J45.901: Primary | ICD-10-CM

## 2017-12-12 LAB
ALBUMIN/GLOB SERPL: 1 {RATIO} (ref 1–2.1)
ALP SERPL-CCNC: 45 U/L (ref 38–126)
ALT SERPL-CCNC: 38 U/L (ref 21–72)
AST SERPL-CCNC: 23 U/L (ref 17–59)
BASOPHILS # BLD AUTO: 0.1 K/UL (ref 0–0.2)
BASOPHILS NFR BLD: 1 % (ref 0–2)
BASOPHILS NFR BLD: 1.7 % (ref 0–2)
BILIRUB SERPL-MCNC: 0.7 MG/DL (ref 0.2–1.3)
BUN SERPL-MCNC: 19 MG/DL (ref 9–20)
CALCIUM SERPL-MCNC: 8.7 MG/DL (ref 8.6–10.4)
CHLORIDE SERPL-SCNC: 103 MMOL/L (ref 98–107)
CO2 SERPL-SCNC: 30 MMOL/L (ref 22–30)
EOSINOPHIL # BLD AUTO: 2 K/UL (ref 0–0.7)
EOSINOPHIL NFR BLD: 31.9 % (ref 0–4)
EOSINOPHIL NFR BLD: 32 % (ref 0–4)
ERYTHROCYTE [DISTWIDTH] IN BLOOD BY AUTOMATED COUNT: 14.3 % (ref 11.5–14.5)
GLOBULIN SER-MCNC: 3.8 GM/DL (ref 2.2–3.9)
GLUCOSE SERPL-MCNC: 95 MG/DL (ref 75–110)
HCT VFR BLD CALC: 37.7 % (ref 35–51)
LYMPHOCYTES # BLD AUTO: 1.7 K/UL (ref 1–4.3)
LYMPHOCYTES NFR BLD AUTO: 26.8 % (ref 20–40)
MCH RBC QN AUTO: 29.9 PG (ref 27–31)
MCHC RBC AUTO-ENTMCNC: 34 G/DL (ref 33–37)
MCV RBC AUTO: 87.9 FL (ref 80–94)
MONOCYTES # BLD: 0.4 K/UL (ref 0–0.8)
MONOCYTES NFR BLD: 6.7 % (ref 0–10)
NEUTROPHILS NFR BLD AUTO: 32 % (ref 50–75)
NRBC BLD AUTO-RTO: 0 % (ref 0–2)
PLATELET # BLD: 180 K/UL (ref 130–400)
PMV BLD AUTO: 8.9 FL (ref 7.2–11.7)
POTASSIUM SERPL-SCNC: 3.4 MMOL/L (ref 3.6–5.2)
PROT SERPL-MCNC: 7.8 G/DL (ref 6.3–8.3)
SODIUM SERPL-SCNC: 139 MMOL/L (ref 132–148)
TOTAL CELLS COUNTED BLD: 100
WBC # BLD AUTO: 6.3 K/UL (ref 4.8–10.8)

## 2017-12-12 PROCEDURE — 96365 THER/PROPH/DIAG IV INF INIT: CPT

## 2017-12-12 PROCEDURE — 71020: CPT

## 2017-12-12 PROCEDURE — 96375 TX/PRO/DX INJ NEW DRUG ADDON: CPT

## 2017-12-12 PROCEDURE — 87804 INFLUENZA ASSAY W/OPTIC: CPT

## 2017-12-12 PROCEDURE — 83880 ASSAY OF NATRIURETIC PEPTIDE: CPT

## 2017-12-12 PROCEDURE — 94150 VITAL CAPACITY TEST: CPT

## 2017-12-12 PROCEDURE — 94640 AIRWAY INHALATION TREATMENT: CPT

## 2017-12-12 PROCEDURE — 99285 EMERGENCY DEPT VISIT HI MDM: CPT

## 2017-12-12 PROCEDURE — 80053 COMPREHEN METABOLIC PANEL: CPT

## 2017-12-12 PROCEDURE — 85025 COMPLETE CBC W/AUTO DIFF WBC: CPT

## 2017-12-12 PROCEDURE — 84484 ASSAY OF TROPONIN QUANT: CPT

## 2017-12-12 RX ADMIN — IPRATROPIUM BROMIDE AND ALBUTEROL SULFATE SCH ML: .5; 3 SOLUTION RESPIRATORY (INHALATION) at 11:32

## 2017-12-12 RX ADMIN — IPRATROPIUM BROMIDE AND ALBUTEROL SULFATE SCH ML: .5; 3 SOLUTION RESPIRATORY (INHALATION) at 11:45

## 2017-12-12 RX ADMIN — IPRATROPIUM BROMIDE AND ALBUTEROL SULFATE SCH ML: .5; 3 SOLUTION RESPIRATORY (INHALATION) at 11:50

## 2017-12-12 NOTE — RAD
HISTORY:

SOB  



COMPARISON:

6/11/2017, January 2nd 1017 and 6/2/2016



TECHNIQUE:

Chest PA and lateral



FINDINGS:



LUNGS:

Bilateral lung hyperinflation-COPD inferred



No consolidation



Right infrahilar minimal peribronchial thickening chronic changes 

-minimal and similar to earlier studies



PLEURA:

No significant pleural effusion identified. No pneumothorax apparent.



CARDIOVASCULAR:

Mild cardiomegaly -similar



OSSEOUS STRUCTURES:

Scoliosis and generalized osteopenia



VISUALIZED UPPER ABDOMEN:

Normal.



OTHER FINDINGS:

None.



IMPRESSION:

No consolidation.  Minimal right infrahilar peribronchial chronic 

appearing inflammatory type changes .  No interval pathology noted



COPD 



Scoliosis and generalized osteopenia 



Cardiomegaly -similar

## 2017-12-12 NOTE — C.PDOC
History Of Present Illness





Arnaud Gilmore is a 77 year old male, with a past medical history of 

asthma and COPD, who presents to the emergency department complaining of 

shortness of breath and wheezing associated with chest pain, cough, sore throat 

and runny nose onset for x3 days. Patient states the symptoms have been on and 

off for the past x3 weeks, but in has worsen in the past x3 days. Patient 

denies any fever or chills. No further medical complaints. 





PMD: Dr. Trent


Time Seen by Provider: 17 10:51


Chief Complaint (Nursing): Shortness Of Breath


History Per: Patient


History/Exam Limitations: no limitations


Onset/Duration Of Symptoms: Days (x3), Intermittent Episodes (x3 months)


Current Symptoms Are (Timing): Still Present


Current Respiratory Medications: See Home Med List


Pain Scale Rating Of: 0


Associated Symptoms: Chest Pain, Other (cough, sore throat, runny nose).  denies

: Fever, Chills


Recent travel outside of the [place]: No





Past Medical History


Reviewed: Historical Data, Nursing Documentation, Vital Signs


Vital Signs: 


 Last Vital Signs











Temp  97.9 F   17 10:45


 


Pulse  60   17 12:43


 


Resp  20   17 12:43


 


BP  105/65   17 12:43


 


Pulse Ox  95   17 14:15














- Medical History


PMH: Anxiety, Asthma, Benign Prostatic Hyperplasia, COPD, Depression, Gastritis

, HTN, Hypercholesterolemia


   Denies: Atrial Fibrillation, Cardia Arrhythmia, CHF, Chronic Kidney Disease


Surgical History: Appendectomy





- [facility] Procedures








INJECT/INFUSE NEC (14)


SUPPLEMENT ABDOMINAL WALL WITH SYNTH SUB, PERC ENDO APPROACH (10/29/15)


VACCINATION NEC (13)








Family History: States: Unknown Family Hx





- Social History


Hx Tobacco Use: No


Hx Alcohol Use: No


Hx Substance Use: No





- Immunization History


Hx Tetanus Toxoid Vaccination: Yes


Hx Influenza Vaccination: Yes ()


Hx Pneumococcal Vaccination: No





Review Of Systems


Constitutional: Negative for: Fever, Chills


ENT: Positive for: Nose Discharge, Throat Pain


Cardiovascular: Positive for: Chest Pain


Respiratory: Positive for: Cough, Shortness of Breath, Wheezing





Physical Exam





- Physical Exam


Appears: No Acute Distress


Skin: Normal Color, Warm, Dry


Head: Atraumatic, Normacephalic


Eye(s): bilateral: Normal Inspection, PERRL, EOMI


Ear(s): Bilateral: Normal


Nose: Normal


Throat: Normal


Neck: Normal ROM, Supple


Cardiovascular: Rhythm Regular


Respiratory: Wheezing


Gastrointestinal/Abdominal: Soft, No Tenderness, No Guarding, No Rebound


Extremity: Normal ROM, No Pedal Edema, No Deformity, No Swelling


Neurological/Psych: Oriented x3, Normal Speech





ED Course And Treatment





- Laboratory Results


Result Diagrams: 


 17 11:27





 17 11:27


ECG: Interpreted By Me, Viewed By Me


ECG Rhythm: Sinus Rhythm


Rate From EC (BPM)


O2 Sat by Pulse Oximetry: 95 (RA)


Pulse Ox Interpretation: Normal





Medical Decision Making


Medical Decision Making: 





Initial Impression: asthma exacerbation





Initial Plan:





--EKG


--B-Type natriuretic peptide


--Comp Metabolic Panel


--Troponin I


--CBC w/ differential


--Chest two views (pa/lat) [RAD]


--Albuterol 3 ml IH


--SOLU-medrol 125 mg IVP


--Nebulizer treatment 


---Peak flow pre/post Tx


--Influenza A B


--reevaluation








Patient feeling better. Will d/c with Rx 








Disposition


Counseled Patient/Family Regarding: Studies Performed, Diagnosis, Need For 

Followup, Rx Given





- Disposition


Referrals: 


Jarad Trent MD [Staff Provider] - 


Disposition: HOME/ ROUTINE


Disposition Time: 14:16


Condition: IMPROVED


Prescriptions: 


Azithromycin 1 tab PO DAILY #6 tab


predniSONE [predniSONE Tab] 60 mg PO DAILY #15 tab


Instructions:  Asthma (ED), Acute Bronchitis (ED)


Forms:  [facility] (Chilean), Gen Discharge Inst [place]an





- POA


Present On Arrival: None





- Clinical Impression


Clinical Impression: 


 Exacerbation of asthma, Bronchitis








- Scribe Statement





Brennan De Leon


Provider Attestation: 








All medical record entries made by the Scribe were at my direction and 

personally dictated by me. I have reviewed the chart and agree that the record 

accurately reflects my personal performance of the history, physical exam, 

medical decision making, and the department course for this patient. I have 

also personally directed, reviewed, and agree with the discharge instructions 

and disposition.

## 2017-12-31 ENCOUNTER — HOSPITAL ENCOUNTER (OUTPATIENT)
Dept: HOSPITAL 31 - C.ER | Age: 77
Setting detail: OBSERVATION
LOS: 3 days | Discharge: HOME | End: 2018-01-03
Attending: INTERNAL MEDICINE | Admitting: FAMILY MEDICINE
Payer: MEDICARE

## 2017-12-31 VITALS — BODY MASS INDEX: 19.9 KG/M2

## 2017-12-31 DIAGNOSIS — J44.1: Primary | ICD-10-CM

## 2017-12-31 DIAGNOSIS — I10: ICD-10-CM

## 2017-12-31 DIAGNOSIS — Z79.899: ICD-10-CM

## 2017-12-31 DIAGNOSIS — N40.0: ICD-10-CM

## 2017-12-31 DIAGNOSIS — E78.00: ICD-10-CM

## 2017-12-31 DIAGNOSIS — I21.4: ICD-10-CM

## 2017-12-31 LAB
ALBUMIN SERPL-MCNC: 4.3 G/DL (ref 3.5–5)
ALBUMIN/GLOB SERPL: 1.3 {RATIO} (ref 1–2.1)
ALT SERPL-CCNC: 30 U/L (ref 21–72)
APTT BLD: 30 SECONDS (ref 21–34)
ARTERIAL BLOOD GAS HEMOGLOBIN: 13.3 G/DL (ref 11.7–17.4)
ARTERIAL BLOOD GAS O2 SAT: 100.4 % (ref 95–98)
ARTERIAL BLOOD GAS PCO2: 53 MM/HG (ref 35–45)
ARTERIAL BLOOD GAS TCO2: 27.1 MMOL/L (ref 22–28)
ARTERIAL PATENCY WRIST A: (no result)
AST SERPL-CCNC: 19 U/L (ref 17–59)
BASOPHILS # BLD AUTO: 0.1 K/UL (ref 0–0.2)
BASOPHILS NFR BLD: 0.9 % (ref 0–2)
BILIRUB UR-MCNC: NEGATIVE MG/DL
BNP SERPL-MCNC: 317 PG/ML (ref 0–900)
BUN SERPL-MCNC: 18 MG/DL (ref 9–20)
CALCIUM SERPL-MCNC: 9 MG/DL (ref 8.6–10.4)
CK MB SERPL-MCNC: 2.12 NG/ML (ref 0–3.38)
EOSINOPHIL # BLD AUTO: 1.8 K/UL (ref 0–0.7)
EOSINOPHIL NFR BLD: 11.9 % (ref 0–4)
ERYTHROCYTE [DISTWIDTH] IN BLOOD BY AUTOMATED COUNT: 14.3 % (ref 11.5–14.5)
GFR NON-AFRICAN AMERICAN: > 60
GLUCOSE UR STRIP-MCNC: (no result) MG/DL
HCO3 BLDA-SCNC: 23.5 MMOL/L (ref 21–28)
HGB BLD-MCNC: 13.5 G/DL (ref 12–18)
INHALED O2 CONCENTRATION: 100 %
INR PPP: 1
LEUKOCYTE ESTERASE UR-ACNC: (no result) LEU/UL
LYMPHOCYTES # BLD AUTO: 3.8 K/UL (ref 1–4.3)
LYMPHOCYTES NFR BLD AUTO: 24.6 % (ref 20–40)
MCH RBC QN AUTO: 30.8 PG (ref 27–31)
MCHC RBC AUTO-ENTMCNC: 34.4 G/DL (ref 33–37)
MCV RBC AUTO: 89.6 FL (ref 80–94)
MONOCYTES # BLD: 1.2 K/UL (ref 0–0.8)
MONOCYTES NFR BLD: 7.6 % (ref 0–10)
NEUTROPHILS # BLD: 8.5 K/UL (ref 1.8–7)
NEUTROPHILS NFR BLD AUTO: 55 % (ref 50–75)
NRBC BLD AUTO-RTO: 0 % (ref 0–2)
PH BLDA: 7.29 [PH] (ref 7.35–7.45)
PH UR STRIP: 5 [PH] (ref 5–8)
PLATELET # BLD: 257 K/UL (ref 130–400)
PMV BLD AUTO: 9.1 FL (ref 7.2–11.7)
PO2 BLDA: 498 MM/HG (ref 80–100)
PROT UR STRIP-MCNC: NEGATIVE MG/DL
PROTHROMBIN TIME: 10.6 SECONDS (ref 9.7–12.2)
RBC # BLD AUTO: 4.38 MIL/UL (ref 4.4–5.9)
RBC # UR STRIP: NEGATIVE /UL
SP GR UR STRIP: 1.01 (ref 1–1.03)
SQUAMOUS EPITHIAL: < 1 /HPF (ref 0–5)
URINE NITRATE: NEGATIVE
UROBILINOGEN UR-MCNC: NORMAL MG/DL (ref 0.2–1)
WBC # BLD AUTO: 15.4 K/UL (ref 4.8–10.8)

## 2017-12-31 PROCEDURE — 80053 COMPREHEN METABOLIC PANEL: CPT

## 2017-12-31 PROCEDURE — 96375 TX/PRO/DX INJ NEW DRUG ADDON: CPT

## 2017-12-31 PROCEDURE — 85730 THROMBOPLASTIN TIME PARTIAL: CPT

## 2017-12-31 PROCEDURE — 94640 AIRWAY INHALATION TREATMENT: CPT

## 2017-12-31 PROCEDURE — 84145 PROCALCITONIN (PCT): CPT

## 2017-12-31 PROCEDURE — 94660 CPAP INITIATION&MGMT: CPT

## 2017-12-31 PROCEDURE — 83880 ASSAY OF NATRIURETIC PEPTIDE: CPT

## 2017-12-31 PROCEDURE — 99285 EMERGENCY DEPT VISIT HI MDM: CPT

## 2017-12-31 PROCEDURE — 83605 ASSAY OF LACTIC ACID: CPT

## 2017-12-31 PROCEDURE — 82550 ASSAY OF CK (CPK): CPT

## 2017-12-31 PROCEDURE — 85025 COMPLETE CBC W/AUTO DIFF WBC: CPT

## 2017-12-31 PROCEDURE — 97162 PT EVAL MOD COMPLEX 30 MIN: CPT

## 2017-12-31 PROCEDURE — 82553 CREATINE MB FRACTION: CPT

## 2017-12-31 PROCEDURE — 82803 BLOOD GASES ANY COMBINATION: CPT

## 2017-12-31 PROCEDURE — 81001 URINALYSIS AUTO W/SCOPE: CPT

## 2017-12-31 PROCEDURE — 84484 ASSAY OF TROPONIN QUANT: CPT

## 2017-12-31 PROCEDURE — 71010: CPT

## 2017-12-31 PROCEDURE — 83735 ASSAY OF MAGNESIUM: CPT

## 2017-12-31 PROCEDURE — 84100 ASSAY OF PHOSPHORUS: CPT

## 2017-12-31 PROCEDURE — 93005 ELECTROCARDIOGRAM TRACING: CPT

## 2017-12-31 PROCEDURE — 97116 GAIT TRAINING THERAPY: CPT

## 2017-12-31 PROCEDURE — 96374 THER/PROPH/DIAG INJ IV PUSH: CPT

## 2017-12-31 PROCEDURE — 36415 COLL VENOUS BLD VENIPUNCTURE: CPT

## 2017-12-31 PROCEDURE — 87804 INFLUENZA ASSAY W/OPTIC: CPT

## 2017-12-31 PROCEDURE — 85610 PROTHROMBIN TIME: CPT

## 2017-12-31 PROCEDURE — 87040 BLOOD CULTURE FOR BACTERIA: CPT

## 2017-12-31 NOTE — C.PDOC
History Of Present Illness


76 y/o M c PMHx asthma/COPD p/w shortness of breath x 8 hours. Denies chest 

pain or fever. Given duoneb en route. Placed on Bi Pap on arrival. Further ROS/

HPI unobtainable due to acuity of patient's condition.


Time Seen by Provider: 12/31/17 23:22


Chief Complaint (Nursing): Shortness Of Breath





Past Medical History


Vital Signs: 


 Last Vital Signs











Temp  98.0 F   12/31/17 23:58


 


Pulse  96 H  12/31/17 23:58


 


Resp  24   12/31/17 23:58


 


BP  140/80   12/31/17 23:58


 


Pulse Ox  100   12/31/17 23:58














- Medical History


PMH: Anxiety, Asthma, Benign Prostatic Hyperplasia, COPD, Depression, Gastritis

, HTN, Hypercholesterolemia


   Denies: Atrial Fibrillation, Cardia Arrhythmia, CHF, Chronic Kidney Disease


Surgical History: Appendectomy





- [facility] Procedures








INJECT/INFUSE NEC (03/19/14)


SUPPLEMENT ABDOMINAL WALL WITH SYNTH SUB, PERC ENDO APPROACH (10/29/15)


VACCINATION NEC (06/02/13)








Family History: States: Unknown Family Hx





- Social History


Hx Tobacco Use: No


Hx Alcohol Use: No


Hx Substance Use: No





- Immunization History


Hx Tetanus Toxoid Vaccination: Yes


Hx Influenza Vaccination: Yes (2017)


Hx Pneumococcal Vaccination: No





Review Of Systems


Review Of Systems: ROS cannot be obtained secondary to pt's inabilty to answer 

questions.





Physical Exam





- Physical Exam


Additional Physical Exam Comments: 


Constitutional: No acute distress.


Head: Normocephalic. Atraumatic.


Eyes: PERRL.


ENT: Moist mucous membranes.


Neck: Supple.


Cardiovascular: Regular rate.Tachycardic. Radial pulse 2+ bilaterally.


Chest: No tenderness.


Respiratory: Bibasilar crackles. Wheezing diffusely. Tachypneic.


GI: Soft. Nontender. Nondistended.


Back: No CVA tenderness.


Musculoskeletal: No tenderness. Mild pitting edema of lower extremities.


Skin: No rash.


Neurologic: Alert, no focal deficit.








ED Course And Treatment





- Laboratory Results


Result Diagrams: 


 12/31/17 23:27





 12/31/17 23:27


O2 Sat by Pulse Oximetry: 100





Medical Decision Making


Medical Decision Making: 


Bi Pap, duonebs, steroids, Lasix, nitro.





 bpm, no ST elevations, normal axis. CXR shows hyperinflation, no new 

consolidation or infiltrate, no obvious pulmonary edema. Pro BNP negative. 

Patient's BP improved to 140/80, still with shortness of breath and borderline 

tachycardia, will benefit from continued duonebs, observation, Bi Pap. Dr. Garces accepts patient to medical service.





Disposition


Discussed With : Iggy Garces





- Disposition


Disposition: HOSPITALIZED


Disposition Time: 00:21


Condition: FAIR


Forms:  [facility] (English)





- Clinical Impression


Clinical Impression: 


 COPD exacerbation

## 2018-01-01 VITALS — RESPIRATION RATE: 20 BRPM

## 2018-01-01 LAB
ALBUMIN SERPL-MCNC: 4.2 G/DL (ref 3.5–5)
ALBUMIN/GLOB SERPL: 1.3 {RATIO} (ref 1–2.1)
ALT SERPL-CCNC: 22 U/L (ref 21–72)
ANISOCYTOSIS BLD QL SMEAR: SLIGHT
APTT BLD: 37 SECONDS (ref 21–34)
AST SERPL-CCNC: 31 U/L (ref 17–59)
BASOPHILS # BLD AUTO: 0 K/UL (ref 0–0.2)
BASOPHILS NFR BLD: 0.2 % (ref 0–2)
BUN SERPL-MCNC: 19 MG/DL (ref 9–20)
CALCIUM SERPL-MCNC: 9.3 MG/DL (ref 8.6–10.4)
CK MB SERPL-MCNC: 2.14 NG/ML (ref 0–3.38)
CK MB SERPL-MCNC: 8.37 NG/ML (ref 0–3.38)
CK MB SERPL-MCNC: 8.69 NG/ML (ref 0–3.38)
CK MB SERPL-MCNC: 9.93 NG/ML (ref 0–3.38)
EOSINOPHIL # BLD AUTO: 0 K/UL (ref 0–0.7)
EOSINOPHIL NFR BLD: 0.1 % (ref 0–4)
ERYTHROCYTE [DISTWIDTH] IN BLOOD BY AUTOMATED COUNT: 14.6 % (ref 11.5–14.5)
GFR NON-AFRICAN AMERICAN: > 60
GIANT PLATELETS BLD QL SMEAR: PRESENT
HGB BLD-MCNC: 13.8 G/DL (ref 12–18)
HYPOCHROMIC: SLIGHT
INR PPP: 1
LG PLATELETS BLD QL SMEAR: PRESENT
LYMPHOCYTE: 6 % (ref 20–40)
LYMPHOCYTES # BLD AUTO: 0.5 K/UL (ref 1–4.3)
LYMPHOCYTES NFR BLD AUTO: 7.6 % (ref 20–40)
MCH RBC QN AUTO: 31.2 PG (ref 27–31)
MCHC RBC AUTO-ENTMCNC: 34.6 G/DL (ref 33–37)
MCV RBC AUTO: 89.9 FL (ref 80–94)
MONOCYTE: 2 % (ref 0–10)
MONOCYTES # BLD: 0.1 K/UL (ref 0–0.8)
MONOCYTES NFR BLD: 1.2 % (ref 0–10)
NEUTROPHILS # BLD: 5.6 K/UL (ref 1.8–7)
NEUTROPHILS NFR BLD AUTO: 90.9 % (ref 50–75)
NEUTROPHILS NFR BLD AUTO: 91 % (ref 50–75)
NEUTS BAND NFR BLD: 1 % (ref 0–2)
NRBC BLD AUTO-RTO: 0 % (ref 0–2)
OVALOCYTES BLD QL SMEAR: SLIGHT
PLATELET # BLD EST: NORMAL 10*3/UL
PLATELET # BLD: 216 K/UL (ref 130–400)
PMV BLD AUTO: 9.6 FL (ref 7.2–11.7)
POIKILOCYTOSIS BLD QL SMEAR: SLIGHT
POLYCHROMIC: SLIGHT
PROTHROMBIN TIME: 11.1 SECONDS (ref 9.7–12.2)
RBC # BLD AUTO: 4.44 MIL/UL (ref 4.4–5.9)
TOTAL CELLS COUNTED BLD: 100
TOXIC GRANULES BLD QL SMEAR: PRESENT
TROPONIN I SERPL-MCNC: 0.02 NG/ML (ref 0–0.12)
TROPONIN I SERPL-MCNC: 1.35 NG/ML (ref 0–0.12)
TROPONIN I SERPL-MCNC: 1.37 NG/ML (ref 0–0.12)
TROPONIN I SERPL-MCNC: 1.65 NG/ML (ref 0–0.12)
WBC # BLD AUTO: 6.1 K/UL (ref 4.8–10.8)

## 2018-01-01 RX ADMIN — IPRATROPIUM BROMIDE AND ALBUTEROL SULFATE SCH ML: .5; 3 SOLUTION RESPIRATORY (INHALATION) at 02:11

## 2018-01-01 RX ADMIN — IPRATROPIUM BROMIDE AND ALBUTEROL SULFATE SCH: .5; 3 SOLUTION RESPIRATORY (INHALATION) at 13:03

## 2018-01-01 RX ADMIN — PANTOPRAZOLE SODIUM SCH MG: 40 TABLET, DELAYED RELEASE ORAL at 10:03

## 2018-01-01 RX ADMIN — TAZOBACTAM SODIUM AND PIPERACILLIN SODIUM SCH MLS/HR: 375; 3 INJECTION, SOLUTION INTRAVENOUS at 11:00

## 2018-01-01 RX ADMIN — IPRATROPIUM BROMIDE AND ALBUTEROL SULFATE SCH ML: .5; 3 SOLUTION RESPIRATORY (INHALATION) at 20:11

## 2018-01-01 RX ADMIN — IPRATROPIUM BROMIDE AND ALBUTEROL SULFATE SCH ML: .5; 3 SOLUTION RESPIRATORY (INHALATION) at 07:08

## 2018-01-01 RX ADMIN — TAZOBACTAM SODIUM AND PIPERACILLIN SODIUM SCH MLS/HR: 375; 3 INJECTION, SOLUTION INTRAVENOUS at 16:33

## 2018-01-01 RX ADMIN — TAZOBACTAM SODIUM AND PIPERACILLIN SODIUM SCH MLS/HR: 375; 3 INJECTION, SOLUTION INTRAVENOUS at 22:02

## 2018-01-01 NOTE — CP.PCM.PN
<Marie Carey - Last Filed: 01/01/18 15:50>





Subjective





- Date & Time of Evaluation


Date of Evaluation: 01/01/18


Time of Evaluation: 10:12





- Subjective


Subjective: 





Medicine Progress Note: Hospitalist Service





Patient seen and examined at bedside. Per nursing no acute events overnight. 

Patient was admitted for difficulty breathing and chest pain. Patient states 

that SOB is improving and chest pain has improved as well. Still having 

productive cough with white phelgm. This morning patient had an elevated 

troponin. EKG at this time showed NSR, no ST changes. Patient also had an 

elevated lactated of 5.7 on ABG. Patient denies any fevers, chills, nausea/

vomiting, cp, palpitations, abdominal pain, urinary symptoms, changes in bowel 

habits. 





Zenter : [id number] used for [place] interpretation 





Objective





- Vital Signs/Intake and Output


Vital Signs (last 24 hours): 


 











Temp Pulse Resp BP Pulse Ox


 


 97.7 F   74   28 H  134/79   100 


 


 01/01/18 07:58  01/01/18 07:58  01/01/18 07:58  01/01/18 07:58  01/01/18 07:58








Intake and Output: 


 











 01/01/18 01/01/18





 06:59 18:59


 


Intake Total 50 


 


Output Total 2250 


 


Balance -2200 














- Medications


Medications: 


 Current Medications





Albuterol/Ipratropium (Duoneb 3 Mg/0.5 Mg (3 Ml) Ud)  3 ml INH RQ6 [facility]


   Last Admin: 01/01/18 07:08 Dose:  3 ml


Aspirin (Ecotrin)  81 mg PO DAILY [facility]


   Last Admin: 01/01/18 10:03 Dose:  81 mg


Finasteride (Proscar)  5 mg PO DAILY [facility]


   Last Admin: 01/01/18 10:03 Dose:  5 mg


Heparin Sodium (Porcine) (Heparin)  5,000 units SC Q8 [facility]


   Last Admin: 01/01/18 06:53 Dose:  5,000 units


Hydrochlorothiazide (Microzide)  12.5 mg PO DAILY [facility]


   Last Admin: 01/01/18 10:03 Dose:  12.5 mg


Heparin Sodium/Sodium Chloride (Heparin 25881 Units/250ml 1/2 Normal Saline)  25

,000 units in 250 mls @ 1.2 mls/hr IV .Q24H PRN; Protocol; 10 UNITS/KG/HR


   PRN Reason: PROTOCOL


Piperacillin Sod/Tazobactam Sod (Zosyn 3.375 Gm Iv Premix)  3.375 gm in 50 mls 

@ 100 mls/hr IVPB Q6H [facility]


Lisinopril (Zestril)  10 mg PO DAILY [facility]


   Last Admin: 01/01/18 10:03 Dose:  10 mg


Methylprednisolone (Solu-Medrol)  60 mg IV Q8H [facility]


Montelukast Sodium (Singulair)  10 mg PO HS [facility]


Pantoprazole Sodium (Protonix Ec Tab)  40 mg PO DAILY [facility]


   Last Admin: 01/01/18 10:03 Dose:  40 mg


Pneumococcal Polyvalent Vaccine (Pneumovax 23 Vaccine)  0.5 ml IM .ONCE ONE


   Stop: 01/04/18 14:01


Rosuvastatin Calcium (Crestor)  10 mg PO HS [facility]











- Labs


Labs: 


 





 12/31/17 23:27 





 12/31/17 23:27 





 











PT  10.6 SECONDS (9.7-12.2)   12/31/17  23:27    


 


INR  1.0   12/31/17  23:27    


 


APTT  30 SECONDS (21-34)   12/31/17  23:27    














- Constitutional


Appears: Well, No Acute Distress





- Head Exam


Head Exam: ATRAUMATIC, NORMAL INSPECTION, NORMOCEPHALIC





- Eye Exam


Eye Exam: EOMI, Normal appearance





- ENT Exam


ENT Exam: Mucous Membranes Moist





- Neck Exam


Neck Exam: Full ROM





- Respiratory Exam


Respiratory Exam: Decreased Breath Sounds, NORMAL BREATHING PATTERN.  absent: 

Rales, Rhonchi, Wheezes





- Cardiovascular Exam


Cardiovascular Exam: REGULAR RHYTHM, +S1, +S2.  absent: Tachycardia





- GI/Abdominal Exam


GI & Abdominal Exam: Soft, Normal Bowel Sounds.  absent: Firm, Guarding, Rigid, 

Tenderness





- Rectal Exam


Rectal Exam: Deferred





- Extremities Exam


Extremities Exam: Normal Inspection.  absent: Calf Tenderness





- Back Exam


Back Exam: NORMAL INSPECTION





- Neurological Exam


Neurological Exam: Alert, Awake, Oriented x3





- Psychiatric Exam


Psychiatric exam: Normal Affect, Normal Mood





- Skin


Skin Exam: Dry, Normal Color, Warm





Assessment and Plan





- Assessment and Plan (Free Text)


Assessment: 





NSTEMI


-Stable, afebrile


-Troponin 0.015 -> 0.016 -> 1.370 -> 1.650


-EKG this morning showed NSR with prolonged QTc


-Patient started on heparin drip


-Aspirin 81 mg daily


-Received Plavix 300mg PO x 1


-Crestor 10mg PO HS


-Will continue to trend troponins q6H


-Cardiology on consult, Dr Chowdhury, f/u recommendations


-NPO for cardiac catherization tomorrow  





Acute COPD Exacerbation


-Patient tolerate bipap last night


-Sating well on NC currently


-ABG this morning was reviewed 


-Solumedrol 60mg IVP Q8H, will continue to taper 


-Continue Duoneb Q6H [facility]EN[facility]


-Singulair 10 mg PO HS


-Supplemental O2


-Pulmonary consulted, Dr Costello, f/u recommendations 





Elevated Lactate, SOB, r/o infectious etiology


-Afebrile


-Leukocytosis improved 15.4 -> 6.1


-Lactate on ABG 5.7


-Started on Zosyn 3.375mg IV Q6H 


-Blood cx pending


-Trend lactate levels q6H


-Will continue to monitor





History of HTN


-Continue Lisinopril 10 po daily


-Continue HCTZ 12.5 mg PO daily





History of HLD


-Continue Crestor 10 mg PO HS





History of BPH


-Continue Finasteride 5 mg PO daily





Prophylactic Measure


Heparin drip


Protonix 40 mg PO daily


NPO after midnight








<Katarzyna Palomino - Last Filed: 01/02/18 16:02>





Objective





- Vital Signs/Intake and Output


Vital Signs (last 24 hours): 


 











Temp Pulse Resp BP Pulse Ox


 


 98.5 F   69   20   124/65   97 


 


 01/01/18 15:54  01/01/18 15:54  01/01/18 15:54  01/01/18 15:54  01/01/18 15:54








Intake and Output: 


 











 01/01/18 01/02/18





 18:59 06:59


 


Intake Total 330 


 


Output Total 500 


 


Balance -170 














- Medications


Medications: 


 Current Medications





Albuterol/Ipratropium (Duoneb 3 Mg/0.5 Mg (3 Ml) Ud)  3 ml INH RQ6 [facility]


   Last Admin: 01/01/18 13:03 Dose:  Not Given


Aspirin (Ecotrin)  81 mg PO DAILY [facility]


   Last Admin: 01/01/18 10:03 Dose:  81 mg


Finasteride (Proscar)  5 mg PO DAILY [facility]


   Last Admin: 01/01/18 10:03 Dose:  5 mg


Hydrochlorothiazide (Microzide)  12.5 mg PO DAILY [facility]


   Last Admin: 01/01/18 10:03 Dose:  12.5 mg


Piperacillin Sod/Tazobactam Sod (Zosyn 3.375 Gm Iv Premix)  3.375 gm in 50 mls 

@ 100 mls/hr IVPB Q6H [facility]


   Last Admin: 01/01/18 16:33 Dose:  100 mls/hr


Heparin Sodium/Sodium Chloride (Heparin 20847 Units/250ml 1/2 Normal Saline)  25

,000 units in 250 mls @ 6.668 mls/hr IV .Q24H ONE; 10 UNITS/KG/HR


   PRN Reason: Protocol


   Stop: 01/02/18 11:14


   Last Admin: 01/01/18 11:51 Dose:  10 units/kg/hr, 6.668 mls/hr


Lisinopril (Zestril)  10 mg PO DAILY [facility]


   Last Admin: 01/01/18 10:03 Dose:  10 mg


Methylprednisolone (Solu-Medrol)  60 mg IV Q8H [facility]


   Last Admin: 01/01/18 17:50 Dose:  60 mg


Montelukast Sodium (Singulair)  10 mg PO HS [facility]


Pantoprazole Sodium (Protonix Ec Tab)  40 mg PO DAILY [facility]


   Last Admin: 01/01/18 10:03 Dose:  40 mg


Pneumococcal Polyvalent Vaccine (Pneumovax 23 Vaccine)  0.5 ml IM .ONCE ONE


   Stop: 01/04/18 14:01


Rosuvastatin Calcium (Crestor)  10 mg PO HS [facility]











- Labs


Labs: 


 





 01/01/18 11:40 





 01/01/18 11:40 





 











PT  11.1 SECONDS (9.7-12.2)   01/01/18  18:49    


 


INR  1.0   01/01/18  18:49    


 


APTT  37 SECONDS (21-34)  H D 01/01/18  18:49    














Attending/Attestation





- Attestation


I have personally seen and examined this patient.: Yes


I have fully participated in the care of the patient.: Yes


I have reviewed all pertinent clinical information, including history, physical 

exam and plan: Yes


Notes (Text): 


Patient was seen and examined 


Discussed with cardiologist DR Mebmreno


I agree with the resident's assessment and plan

## 2018-01-01 NOTE — CP.PCM.CON
History of Present Illness





- History of Present Illness


History of Present Illness: 





I was asked to evaluate patient by the primary team.





Patient is a 77 year old male with PMH HTN, hypercholesterolemia and COPD who 

presents with chest pain.  The patient states symptoms began one day ago.  He 

was at home when he developed pressure in the center of his chest.  He became 

dyspneic and presented to [facility] for further evaluation.  The patient 

was noted to have progressive dyspnea.











Review of Systems





- Constitutional


Constitutional: absent: As Per HPI, Anorexia, Chills, Daytime Sleepiness, 

Excessive Sweating, Fatigue, Fever, Frequent Falls, Headache, Increased Appetite

, Lethargy, Malaise, Night Sweats, Snoring, Sleep Apnea, Weight Gain, Weight 

Loss, Weakness, Other





- EENT


Eyes: absent: As Per HPI, Blind Spots, Blurred Vision, Change in Vision, 

Decreased Night Vision, Diplopia, Discharge, Dry Eye, Exophthalmos, Floaters, 

Irritation, Itchy Eyes, Loss of Peripheral Vision, Pain, Photophobia, Requires 

Corrective Lenses, Sees Flashes, Spots in Vision, Tunnel Vision, Other Visual 

Disturbances, Loss of Vision, Other


Ears: absent: As Per HPI, Decreased Hearing, Ear Discharge, Ear Pain, Tinnitus, 

Abnormal Hearing, Disequilibrium, Dizziness, Other


Nose/Mouth/Throat: absent: As Per HPI, Epistaxis, Nasal Congestion, Nasal 

Discharge, Nasal Obstruction, Nasal Trauma, Nose Pain, Post Nasal Drip, Sinus 

Pain, Sinus Pressure, Bleeding Gums, Change in Voice, Dental Pain, Dry Mouth, 

Dysphagia, Halitosis, Hoarsness, Lip Swelling, Mouth Lesions, Mouth Pain, 

Odynophagia, Sore Throat, Throat Swelling, Tongue Swelling, Facial Pain, Neck 

Pain, Neck Mass, Other





- Cardiovascular


Cardiovascular: Chest Pain, Dyspnea





- Respiratory


Respiratory: absent: As Per HPI, Cough, Dyspnea, Hemoptysis, Dyspnea on Exertion

, Wheezing, Snoring, Stridor, Pain on Inspiration, Chest Congestion, Excessive 

Mucous Production, Change in Mucous Color, Pain with Coughing, Other





- Gastrointestinal


Gastrointestinal: absent: As Per HPI, Abdominal Pain, Belching, Bloating, 

Change in Bowel Habits, Change in Stool Character, Coffee Ground Emesis, 

Constipation, Cramping, Diarrhea, Dyspepsia, Dysphagia, Early Satiety, 

Excessive Flatus, Fecal Incontinence, Heartburn, Hematemesis, Hematochezia, 

Loose Stools, Melena, Nausea, Odynophagia, Temesmus, Vomiting, Other





- Genitourinary


Genitourinary: absent: As Per HPI, Change in Urinary Stream, Difficulty 

Urinating, Dysuria, Flank Pain, Hematuria, Pyuria, Nocturia, Urinary 

Incontinence, Urinary Frequency, Urinary Hesitance, Urinary Urgency, Voiding 

Freq/Small Amts, Freq UTI, Hx Renal/Bladder Calculi, Hx /Renal Surgery, 

Bladder Distension, Other





- Musculoskeletal


Musculoskeletal: absent: As Per HPI, Abnormal Gait, Arthralgias, Atrophy, Back 

Pain, Deformity, Joint Swelling, Limited Range of Motion, Loss of Height, 

Muscle Cramps, Muscle Weakness, Myalgias, Neck Pain, Numbness, Radiating Pain 

into Limb, Stiffness, Tingling, Other





- Integumentary


Integumentary: absent: As Per HPI, Acne, Alopecia, Bleeding Lesions, Change in 

Hair, Change in Nails, Change in Pigmentation, Changing Lesions, Dry Skin, 

Erythema, Furuncle, Hirsutism, Lesions, New Lesions, Non-Healing Lesions, 

Photosensitivity, Pruritus, Rash, Skin Pain, Skin Ulcer, Sores, Striae, Swelling

, Unusual Bruising, Wounds, Jaundice, Other





- Neurological


Neurological: absent: As Per HPI, Abnormal Gait, Abnormal Hearing, Abnormal 

Movements, Abnormal Speech, Behavioral Changes, Burning Sensations, Confusion, 

Convulsions, Disequilibrium, Dizziness, Numbness, Focal Weakness, Frequent Falls

, Headaches, Lack of Coordination, Loss of Vision, Memory Loss, Paresthesias, 

Radicular Pain, Restless Legs, Sensory Deficit, Syncope, Tingling, Tremor, 

Vertigo, Weakness, Other Visual Disturbances, Other





- Psychiatric


Psychiatric: absent: As Per HPI, Abnormal Sleep Pattern, Anhedonia, Anxiety, 

Auditory Hallucinations, Behavioral Changes, Change in Appetite, Change in 

Libido, Confusion, Depression, Difficulty Concentrating, Hallucinations, 

Homicidal Ideation, Hopelessness, Irritability, Memory Loss, Mood Swings, Panic 

Attacks, Paranoia, Suicidal Ideation, Visual Hallucinations, Tactile 

Hallucinations, Other





- Endocrine


Endocrine: absent: As Per HPI, Change in Body Appearance, Change in Libido, 

Cold Intolorance, Deepening of Voice, Excessive Sweating, Fatigue, Flushing, 

Heat Intolorance, Increase in Ring/Shoe/Hat Size, Palpitations, Polydipsia, 

Polyphagia, Polyuria, Other





- Hematologic/Lymphatic


Hematologic: absent: As Per HPI, Easy Bleeding, Easy Bruising, Lymphadenopathy, 

Other





Past Patient History





- Infectious Disease


Hx of Infectious Diseases: None





- Past Medical History & Family History


Past Medical History?: Yes





- Past Social History


Smoking Status: Never Smoked





- CARDIAC


Hx Cardiac Disorders: Yes


Hx Atrial Fibrillation: No


Hx Cardia Arrhythmia: No


Hx Congestive Heart Failure: No


Hx Hypercholesterolemia: Yes


Hx Hypertension: Yes





- PULMONARY


Hx Respiratory Disorders: Yes


Hx Asthma: Yes


Hx Chronic Obstructive Pulmonary Disease (COPD): Yes





- NEUROLOGICAL


Hx Neurological Disorder: No





- HEENT


Hx HEENT Problems: Yes


Other/Comment: wear eyeglasses





- RENAL


Hx Chronic Kidney Disease: No





- ENDOCRINE/METABOLIC


Hx Endocrine Disorders: No





- HEMATOLOGICAL/ONCOLOGICAL


Hx Blood Disorders: No





- INTEGUMENTARY


Hx Dermatological Problems: No





- MUSCULOSKELETAL/RHEUMATOLOGICAL


Hx Musculoskeletal Disorders: No


Hx Falls: No





- GASTROINTESTINAL


Hx Gastrointestinal Disorders: Yes


Hx Gastritis: Yes





- GENITOURINARY/GYNECOLOGICAL


Hx Genitourinary Disorders: Yes


Hx Prostate Problems: Yes





- PSYCHIATRIC


Hx Psychophysiologic Disorder: Yes


Hx Anxiety: Yes


Hx Depression: Yes


Hx Substance Use: No





- SURGICAL HISTORY


Hx Surgeries: Yes


Hx Appendectomy: Yes





- ANESTHESIA


Hx Anesthesia: Yes


Hx Anesthesia Reactions: No


Hx Malignant Hyperthermia: No





Meds


Allergies/Adverse Reactions: 


 Allergies











Allergy/AdvReac Type Severity Reaction Status Date / Time


 


No Known Allergies Allergy   Verified 12/12/17 11:05














- Medications


Medications: 


 Current Medications





Albuterol/Ipratropium (Duoneb 3 Mg/0.5 Mg (3 Ml) Ud)  3 ml INH RQ6 [facility]


   Last Admin: 01/01/18 13:03 Dose:  Not Given


Aspirin (Ecotrin)  81 mg PO DAILY [facility]


   Last Admin: 01/01/18 10:03 Dose:  81 mg


Finasteride (Proscar)  5 mg PO DAILY [facility]


   Last Admin: 01/01/18 10:03 Dose:  5 mg


Hydrochlorothiazide (Microzide)  12.5 mg PO DAILY [facility]


   Last Admin: 01/01/18 10:03 Dose:  12.5 mg


Piperacillin Sod/Tazobactam Sod (Zosyn 3.375 Gm Iv Premix)  3.375 gm in 50 mls 

@ 100 mls/hr IVPB Q6H [facility]


   Last Admin: 01/01/18 11:00 Dose:  100 mls/hr


Heparin Sodium/Sodium Chloride (Heparin 27985 Units/250ml 1/2 Normal Saline)  25

,000 units in 250 mls @ 6.668 mls/hr IV .Q24H ONE; 10 UNITS/KG/HR


   PRN Reason: Protocol


   Stop: 01/02/18 11:14


   Last Admin: 01/01/18 11:51 Dose:  10 units/kg/hr, 6.668 mls/hr


Lisinopril (Zestril)  10 mg PO DAILY [facility]


   Last Admin: 01/01/18 10:03 Dose:  10 mg


Methylprednisolone (Solu-Medrol)  60 mg IV Q8H [facility]


   Last Admin: 01/01/18 11:56 Dose:  60 mg


Montelukast Sodium (Singulair)  10 mg PO HS [facility]


Pantoprazole Sodium (Protonix Ec Tab)  40 mg PO DAILY [facility]


   Last Admin: 01/01/18 10:03 Dose:  40 mg


Pneumococcal Polyvalent Vaccine (Pneumovax 23 Vaccine)  0.5 ml IM .ONCE ONE


   Stop: 01/04/18 14:01


Rosuvastatin Calcium (Crestor)  10 mg PO [facility]











Physical Exam





- Constitutional


Appears: Non-toxic





- Head Exam


Head Exam: NORMAL INSPECTION





- Eye Exam


Eye Exam: Normal appearance





- ENT Exam


ENT Exam: Mucous Membranes Moist





- Neck Exam


Neck exam: Positive for: Full Rom





- Respiratory Exam


Respiratory Exam: NORMAL BREATHING PATTERN





- Cardiovascular Exam


Cardiovascular Exam: REGULAR RHYTHM





- GI/Abdominal Exam


GI & Abdominal Exam: Normal Bowel Sounds





- Rectal Exam


Rectal Exam: Deferred





- Extremities Exam


Extremities exam: Negative for: pedal edema





- Back Exam


Back exam: NORMAL INSPECTION





- Neurological Exam


Neurological exam: Alert, Oriented x3





- Psychiatric Exam


Psychiatric exam: Normal Affect





- Skin


Skin Exam: Normal Color





Results





- Vital Signs


Recent Vital Signs: 


 Last Vital Signs











Temp  97.7 F   01/01/18 07:58


 


Pulse  74   01/01/18 07:58


 


Resp  28 H  01/01/18 07:58


 


BP  134/79   01/01/18 07:58


 


Pulse Ox  100   01/01/18 07:58














- Labs


Result Diagrams: 


 01/01/18 11:40





 01/01/18 11:40


Labs: 


 Laboratory Results - last 24 hr











  12/31/17 12/31/17 12/31/17





  23:27 23:27 23:27


 


WBC   15.4 H D 


 


RBC   4.38 L 


 


Hgb   13.5 


 


Hct   39.3 


 


MCV   89.6 


 


MCH   30.8 


 


MCHC   34.4 


 


RDW   14.3 


 


Plt Count   257 


 


MPV   9.1 


 


Neut % (Auto)   55.0 


 


Lymph % (Auto)   24.6 


 


Mono % (Auto)   7.6 


 


Eos % (Auto)   11.9 H 


 


Baso % (Auto)   0.9 


 


Neut #   8.5 H 


 


Lymph #   3.8 


 


Mono #   1.2 H 


 


Eos #   1.8 H 


 


Baso #   0.1 


 


Neutrophils % (Manual)   


 


Band Neutrophils %   


 


Lymphocytes % (Manual)   


 


Monocytes % (Manual)   


 


Toxic Granulation   


 


Platelet Estimate   


 


Large Platelets   


 


Giant Platelets   


 


Polychromasia   


 


Hypochromasia (manual)   


 


Poikilocytosis (manual   


 


Anisocytosis (manual)   


 


Ovalocytes   


 


PT    10.6


 


INR    1.0


 


APTT    30


 


Puncture Site   


 


pCO2   


 


pO2   


 


HCO3   


 


ABG pH   


 


ABG Total CO2   


 


ABG O2 Saturation   


 


ABG Base Excess   


 


ABG Hemoglobin   


 


ABG Carboxyhemoglobin   


 


POC ABG HHb (Measured)   


 


ABG Methemoglobin   


 


Rolan Test   


 


A-a O2 Difference   


 


Respiratory Index   


 


Hgb O2 Saturation   


 


Vent Mode   


 


FiO2   


 


Inspiratory BiPAP   


 


Expiratory BiPAP   


 


Sodium  138  


 


Potassium  3.9  


 


Chloride  101  


 


Carbon Dioxide  27  


 


Anion Gap  14  


 


BUN  18  


 


Creatinine  0.5 L  


 


Est GFR ( Amer)  > 60  


 


Est GFR (Non-Af Amer)  > 60  


 


Random Glucose  205 H  


 


Calcium  9.0  


 


Total Bilirubin  0.5  


 


AST  19  


 


ALT  30  


 


Alkaline Phosphatase  47  


 


Total Creatine Kinase  113  


 


CK-MB (Mass)  2.12  


 


Troponin I  0.0150  


 


NT-Pro-B Natriuret Pep  317  


 


Total Protein  7.7  


 


Albumin  4.3  


 


Globulin  3.4  


 


Albumin/Globulin Ratio  1.3  


 


Urine Color   


 


Urine Clarity   


 


Urine pH   


 


Ur Specific Gravity   


 


Urine Protein   


 


Urine Glucose (UA)   


 


Urine Ketones   


 


Urine Blood   


 


Urine Nitrate   


 


Urine Bilirubin   


 


Urine Urobilinogen   


 


Ur Leukocyte Esterase   


 


Urine WBC (Auto)   


 


Urine RBC (Auto)   


 


Ur Squamous Epith Cells   


 


Influenza Typ A,B (EIA)   














  12/31/17 12/31/17 01/01/18





  23:35 23:46 01:44


 


WBC   


 


RBC   


 


Hgb   


 


Hct   


 


MCV   


 


MCH   


 


MCHC   


 


RDW   


 


Plt Count   


 


MPV   


 


Neut % (Auto)   


 


Lymph % (Auto)   


 


Mono % (Auto)   


 


Eos % (Auto)   


 


Baso % (Auto)   


 


Neut #   


 


Lymph #   


 


Mono #   


 


Eos #   


 


Baso #   


 


Neutrophils % (Manual)   


 


Band Neutrophils %   


 


Lymphocytes % (Manual)   


 


Monocytes % (Manual)   


 


Toxic Granulation   


 


Platelet Estimate   


 


Large Platelets   


 


Giant Platelets   


 


Polychromasia   


 


Hypochromasia (manual)   


 


Poikilocytosis (manual   


 


Anisocytosis (manual)   


 


Ovalocytes   


 


PT   


 


INR   


 


APTT   


 


Puncture Site  Rr  


 


pCO2  53 H  


 


pO2  498 H  


 


HCO3  23.5  


 


ABG pH  7.29 L  


 


ABG Total CO2  27.1  


 


ABG O2 Saturation  100.4 H  


 


ABG Base Excess  -1.8  


 


ABG Hemoglobin  13.3  


 


ABG Carboxyhemoglobin  1.6 H  


 


POC ABG HHb (Measured)  -0.4 L  


 


ABG Methemoglobin  1.5  


 


Rolan Test  Pos  


 


A-a O2 Difference  149.0  


 


Respiratory Index  0.3  


 


Hgb O2 Saturation  97.3  


 


Vent Mode  Bipap  


 


FiO2  100.0  


 


Inspiratory BiPAP  12  


 


Expiratory BiPAP  6  


 


Sodium   


 


Potassium   


 


Chloride   


 


Carbon Dioxide   


 


Anion Gap   


 


BUN   


 


Creatinine   


 


Est GFR ( Amer)   


 


Est GFR (Non-Af Amer)   


 


Random Glucose   


 


Calcium   


 


Total Bilirubin   


 


AST   


 


ALT   


 


Alkaline Phosphatase   


 


Total Creatine Kinase    110


 


CK-MB (Mass)    2.14


 


Troponin I    0.0160


 


NT-Pro-B Natriuret Pep   


 


Total Protein   


 


Albumin   


 


Globulin   


 


Albumin/Globulin Ratio   


 


Urine Color   Straw 


 


Urine Clarity   Clear 


 


Urine pH   5.0 


 


Ur Specific Gravity   1.010 


 


Urine Protein   Negative 


 


Urine Glucose (UA)   1+ H 


 


Urine Ketones   Negative 


 


Urine Blood   Negative 


 


Urine Nitrate   Negative 


 


Urine Bilirubin   Negative 


 


Urine Urobilinogen   Normal 


 


Ur Leukocyte Esterase   Neg 


 


Urine WBC (Auto)   < 1 


 


Urine RBC (Auto)   1 


 


Ur Squamous Epith Cells   < 1 


 


Influenza Typ A,B (EIA)   














  01/01/18 01/01/18 01/01/18





  08:18 11:40 11:40


 


WBC   6.1  D 


 


RBC   4.44 


 


Hgb   13.8 


 


Hct   39.9 


 


MCV   89.9 


 


MCH   31.2 H 


 


MCHC   34.6 


 


RDW   14.6 H 


 


Plt Count   216 


 


MPV   9.6 


 


Neut % (Auto)   90.9 H 


 


Lymph % (Auto)   7.6 L 


 


Mono % (Auto)   1.2 


 


Eos % (Auto)   0.1 


 


Baso % (Auto)   0.2 


 


Neut #   5.6 


 


Lymph #   0.5 L 


 


Mono #   0.1 


 


Eos #   0.0 


 


Baso #   0.0 


 


Neutrophils % (Manual)   91 H 


 


Band Neutrophils %   1 


 


Lymphocytes % (Manual)   6 L 


 


Monocytes % (Manual)   2 


 


Toxic Granulation   Present 


 


Platelet Estimate   Normal 


 


Large Platelets   Present 


 


Giant Platelets   Present 


 


Polychromasia   Slight 


 


Hypochromasia (manual)   Slight 


 


Poikilocytosis (manual   Slight 


 


Anisocytosis (manual)   Slight 


 


Ovalocytes   Slight 


 


PT   


 


INR   


 


APTT   


 


Puncture Site   


 


pCO2   


 


pO2   


 


HCO3   


 


ABG pH   


 


ABG Total CO2   


 


ABG O2 Saturation   


 


ABG Base Excess   


 


ABG Hemoglobin   


 


ABG Carboxyhemoglobin   


 


POC ABG HHb (Measured)   


 


ABG Methemoglobin   


 


Rolan Test   


 


A-a O2 Difference   


 


Respiratory Index   


 


Hgb O2 Saturation   


 


Vent Mode   


 


FiO2   


 


Inspiratory BiPAP   


 


Expiratory BiPAP   


 


Sodium    136


 


Potassium    4.1


 


Chloride    98


 


Carbon Dioxide    26


 


Anion Gap    17


 


BUN    19


 


Creatinine    0.6 L


 


Est GFR ( Amer)    > 60


 


Est GFR (Non-Af Amer)    > 60


 


Random Glucose    165 H


 


Calcium    9.3


 


Total Bilirubin    0.5


 


AST    31


 


ALT    22


 


Alkaline Phosphatase    47


 


Total Creatine Kinase  194 H  


 


CK-MB (Mass)  8.69 H  


 


Troponin I  1.3700 H*  


 


NT-Pro-B Natriuret Pep   


 


Total Protein    7.4


 


Albumin    4.2


 


Globulin    3.2


 


Albumin/Globulin Ratio    1.3


 


Urine Color   


 


Urine Clarity   


 


Urine pH   


 


Ur Specific Gravity   


 


Urine Protein   


 


Urine Glucose (UA)   


 


Urine Ketones   


 


Urine Blood   


 


Urine Nitrate   


 


Urine Bilirubin   


 


Urine Urobilinogen   


 


Ur Leukocyte Esterase   


 


Urine WBC (Auto)   


 


Urine RBC (Auto)   


 


Ur Squamous Epith Cells   


 


Influenza Typ A,B (EIA)   














  01/01/18





  Unknown


 


WBC 


 


RBC 


 


Hgb 


 


Hct 


 


MCV 


 


MCH 


 


MCHC 


 


RDW 


 


Plt Count 


 


MPV 


 


Neut % (Auto) 


 


Lymph % (Auto) 


 


Mono % (Auto) 


 


Eos % (Auto) 


 


Baso % (Auto) 


 


Neut # 


 


Lymph # 


 


Mono # 


 


Eos # 


 


Baso # 


 


Neutrophils % (Manual) 


 


Band Neutrophils % 


 


Lymphocytes % (Manual) 


 


Monocytes % (Manual) 


 


Toxic Granulation 


 


Platelet Estimate 


 


Large Platelets 


 


Giant Platelets 


 


Polychromasia 


 


Hypochromasia (manual) 


 


Poikilocytosis (manual 


 


Anisocytosis (manual) 


 


Ovalocytes 


 


PT 


 


INR 


 


APTT 


 


Puncture Site 


 


pCO2 


 


pO2 


 


HCO3 


 


ABG pH 


 


ABG Total CO2 


 


ABG O2 Saturation 


 


ABG Base Excess 


 


ABG Hemoglobin 


 


ABG Carboxyhemoglobin 


 


POC ABG HHb (Measured) 


 


ABG Methemoglobin 


 


Rolan Test 


 


A-a O2 Difference 


 


Respiratory Index 


 


Hgb O2 Saturation 


 


Vent Mode 


 


FiO2 


 


Inspiratory BiPAP 


 


Expiratory BiPAP 


 


Sodium 


 


Potassium 


 


Chloride 


 


Carbon Dioxide 


 


Anion Gap 


 


BUN 


 


Creatinine 


 


Est GFR ( Amer) 


 


Est GFR (Non-Af Amer) 


 


Random Glucose 


 


Calcium 


 


Total Bilirubin 


 


AST 


 


ALT 


 


Alkaline Phosphatase 


 


Total Creatine Kinase 


 


CK-MB (Mass) 


 


Troponin I 


 


NT-Pro-B Natriuret Pep 


 


Total Protein 


 


Albumin 


 


Globulin 


 


Albumin/Globulin Ratio 


 


Urine Color 


 


Urine Clarity 


 


Urine pH 


 


Ur Specific Gravity 


 


Urine Protein 


 


Urine Glucose (UA) 


 


Urine Ketones 


 


Urine Blood 


 


Urine Nitrate 


 


Urine Bilirubin 


 


Urine Urobilinogen 


 


Ur Leukocyte Esterase 


 


Urine WBC (Auto) 


 


Urine RBC (Auto) 


 


Ur Squamous Epith Cells 


 


Influenza Typ A,B (EIA)  Negative for flu a/b














- EKG Data


EKG Interpreted by: Myself


EKG shows normal: Sinus rhythm





Assessment & Plan


(1) NSTEMI (non-ST elevated myocardial infarction)


Assessment and Plan: 


patient will need evaluation for occlusive coronary stenosis given symptoms.  

will perform cardiac cath.  NPO after midnight.  recommend anticoagulation with 

lovenox today.  start aspirin.


Status: Acute   





(2) HTN (hypertension)


Assessment and Plan: 


blood pressure control


Status: Chronic   





(3) Hyperlipidemia


Assessment and Plan: 


statin therapy


Status: Chronic

## 2018-01-01 NOTE — CP.PCM.CON
History of Present Illness





- History of Present Illness


History of Present Illness: 





reason for consultation: shortness of breath





77-year-old male with COPD, hypertension, hyperlipidemia presented to emerg 

with worsening shortness of breath. Patient was placed on BiPAP  respirator 

stress. Patient states breathing is much better now





PMHx: COPD, Asthma, BPH, HTN, Hyperlipidemia


PSHx: Appendectomy, Prostate Surgery


Social: Denies current or past use of cigarettes, drugs, and alcohol. Worked 

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 

harmful toxins that precipitated his asthma/COPD. Lives alone.


Family History: Father,  at 74,  of "stomach pain and natural causes

", Mother,  at 74


Allergies: Seasonal NKDA





Review of Systems





- Review of Systems


All systems: reviewed and no additional remarkable complaints except (shortness 

of breath)





Past Patient History





- Infectious Disease


Hx of Infectious Diseases: None





- Past Medical History & Family History


Past Medical History?: Yes





- Past Social History


Smoking Status: Never Smoked





- CARDIAC


Hx Cardiac Disorders: Yes


Hx Atrial Fibrillation: No


Hx Cardia Arrhythmia: No


Hx Congestive Heart Failure: No


Hx Hypercholesterolemia: Yes


Hx Hypertension: Yes





- PULMONARY


Hx Respiratory Disorders: Yes


Hx Asthma: Yes


Hx Chronic Obstructive Pulmonary Disease (COPD): Yes





- NEUROLOGICAL


Hx Neurological Disorder: No





- HEENT


Hx HEENT Problems: Yes


Other/Comment: wear eyeglasses





- RENAL


Hx Chronic Kidney Disease: No





- ENDOCRINE/METABOLIC


Hx Endocrine Disorders: No





- HEMATOLOGICAL/ONCOLOGICAL


Hx Blood Disorders: No





- INTEGUMENTARY


Hx Dermatological Problems: No





- MUSCULOSKELETAL/RHEUMATOLOGICAL


Hx Musculoskeletal Disorders: No


Hx Falls: No





- GASTROINTESTINAL


Hx Gastrointestinal Disorders: Yes


Hx Gastritis: Yes





- GENITOURINARY/GYNECOLOGICAL


Hx Genitourinary Disorders: Yes


Hx Prostate Problems: Yes





- PSYCHIATRIC


Hx Psychophysiologic Disorder: Yes


Hx Anxiety: Yes


Hx Depression: Yes


Hx Substance Use: No





- SURGICAL HISTORY


Hx Surgeries: Yes


Hx Appendectomy: Yes





- ANESTHESIA


Hx Anesthesia: Yes


Hx Anesthesia Reactions: No


Hx Malignant Hyperthermia: No





Meds


Allergies/Adverse Reactions: 


 Allergies











Allergy/AdvReac Type Severity Reaction Status Date / Time


 


No Known Allergies Allergy   Verified 17 11:05














- Medications


Medications: 


 Current Medications





Albuterol/Ipratropium (Duoneb 3 Mg/0.5 Mg (3 Ml) Ud)  3 ml INH RQ6 JENY


   Last Admin: 18 13:03 Dose:  Not Given


Aspirin (Ecotrin)  81 mg PO DAILY [facility]


   Last Admin: 18 10:03 Dose:  81 mg


Finasteride (Proscar)  5 mg PO DAILY [facility]


   Last Admin: 18 10:03 Dose:  5 mg


Hydrochlorothiazide (Microzide)  12.5 mg PO DAILY [facility]


   Last Admin: 18 10:03 Dose:  12.5 mg


Piperacillin Sod/Tazobactam Sod (Zosyn 3.375 Gm Iv Premix)  3.375 gm in 50 mls 

@ 100 mls/hr IVPB Q6H [facility]


   Last Admin: 18 11:00 Dose:  100 mls/hr


Heparin Sodium/Sodium Chloride (Heparin 55749 Units/250ml 1/2 Normal Saline)  25

,000 units in 250 mls @ 6.668 mls/hr IV .Q24H ONE; 10 UNITS/KG/HR


   PRN Reason: Protocol


   Stop: 18 11:14


   Last Admin: 18 11:51 Dose:  10 units/kg/hr, 6.668 mls/hr


Lisinopril (Zestril)  10 mg PO DAILY [facility]


   Last Admin: 18 10:03 Dose:  10 mg


Methylprednisolone (Solu-Medrol)  60 mg IV Q8H [facility]


   Last Admin: 18 11:56 Dose:  60 mg


Montelukast Sodium (Singulair)  10 mg PO [facility]


Pantoprazole Sodium (Protonix Ec Tab)  40 mg PO DAILY [facility]


   Last Admin: 18 10:03 Dose:  40 mg


Pneumococcal Polyvalent Vaccine (Pneumovax 23 Vaccine)  0.5 ml IM .ONCE ONE


   Stop: 18 14:01


Rosuvastatin Calcium (Crestor)  10 mg PO [facility]











Physical Exam





- Head Exam


Head Exam: ATRAUMATIC, NORMOCEPHALIC





- Eye Exam


Eye Exam: Normal appearance





- ENT Exam


ENT Exam: Mucous Membranes Moist





- Neck Exam


Neck exam: Positive for: Normal Inspection





- Respiratory Exam


Respiratory Exam: Clear to Auscultation Bilateral





- Cardiovascular Exam


Cardiovascular Exam: REGULAR RHYTHM





- GI/Abdominal Exam


GI & Abdominal Exam: Normal Bowel Sounds, Soft





- Extremities Exam


Extremities exam: Positive for: normal inspection





- Neurological Exam


Neurological exam: Alert, Oriented x3





Results





- Vital Signs


Recent Vital Signs: 


 Last Vital Signs











Temp  97.7 F   18 07:58


 


Pulse  74   18 07:58


 


Resp  28 H  18 07:58


 


BP  134/79   18 07:58


 


Pulse Ox  100   18 07:58














- Labs


Result Diagrams: 


 18 11:40





 18 11:40


Labs: 


 Laboratory Results - last 24 hr











  17





  23:27 23:27 23:27


 


WBC   15.4 H D 


 


RBC   4.38 L 


 


Hgb   13.5 


 


Hct   39.3 


 


MCV   89.6 


 


MCH   30.8 


 


MCHC   34.4 


 


RDW   14.3 


 


Plt Count   257 


 


MPV   9.1 


 


Neut % (Auto)   55.0 


 


Lymph % (Auto)   24.6 


 


Mono % (Auto)   7.6 


 


Eos % (Auto)   11.9 H 


 


Baso % (Auto)   0.9 


 


Neut #   8.5 H 


 


Lymph #   3.8 


 


Mono #   1.2 H 


 


Eos #   1.8 H 


 


Baso #   0.1 


 


Neutrophils % (Manual)   


 


Band Neutrophils %   


 


Lymphocytes % (Manual)   


 


Monocytes % (Manual)   


 


Toxic Granulation   


 


Platelet Estimate   


 


Large Platelets   


 


Giant Platelets   


 


Polychromasia   


 


Hypochromasia (manual)   


 


Poikilocytosis (manual   


 


Anisocytosis (manual)   


 


Ovalocytes   


 


PT    10.6


 


INR    1.0


 


APTT    30


 


Puncture Site   


 


pCO2   


 


pO2   


 


HCO3   


 


ABG pH   


 


ABG Total CO2   


 


ABG O2 Saturation   


 


ABG Base Excess   


 


ABG Hemoglobin   


 


ABG Carboxyhemoglobin   


 


POC ABG HHb (Measured)   


 


ABG Methemoglobin   


 


Rolan Test   


 


A-a O2 Difference   


 


Respiratory Index   


 


Hgb O2 Saturation   


 


Vent Mode   


 


FiO2   


 


Inspiratory BiPAP   


 


Expiratory BiPAP   


 


Sodium  138  


 


Potassium  3.9  


 


Chloride  101  


 


Carbon Dioxide  27  


 


Anion Gap  14  


 


BUN  18  


 


Creatinine  0.5 L  


 


Est GFR ( Amer)  > 60  


 


Est GFR (Non-Af Amer)  > 60  


 


Random Glucose  205 H  


 


Lactic Acid   


 


Calcium  9.0  


 


Total Bilirubin  0.5  


 


AST  19  


 


ALT  30  


 


Alkaline Phosphatase  47  


 


Total Creatine Kinase  113  


 


CK-MB (Mass)  2.12  


 


Troponin I  0.0150  


 


NT-Pro-B Natriuret Pep  317  


 


Total Protein  7.7  


 


Albumin  4.3  


 


Globulin  3.4  


 


Albumin/Globulin Ratio  1.3  


 


Urine Color   


 


Urine Clarity   


 


Urine pH   


 


Ur Specific Gravity   


 


Urine Protein   


 


Urine Glucose (UA)   


 


Urine Ketones   


 


Urine Blood   


 


Urine Nitrate   


 


Urine Bilirubin   


 


Urine Urobilinogen   


 


Ur Leukocyte Esterase   


 


Urine WBC (Auto)   


 


Urine RBC (Auto)   


 


Ur Squamous Epith Cells   


 


Influenza Typ A,B (EIA)   














  17





  23:35 23:46 01:44


 


WBC   


 


RBC   


 


Hgb   


 


Hct   


 


MCV   


 


MCH   


 


MCHC   


 


RDW   


 


Plt Count   


 


MPV   


 


Neut % (Auto)   


 


Lymph % (Auto)   


 


Mono % (Auto)   


 


Eos % (Auto)   


 


Baso % (Auto)   


 


Neut #   


 


Lymph #   


 


Mono #   


 


Eos #   


 


Baso #   


 


Neutrophils % (Manual)   


 


Band Neutrophils %   


 


Lymphocytes % (Manual)   


 


Monocytes % (Manual)   


 


Toxic Granulation   


 


Platelet Estimate   


 


Large Platelets   


 


Giant Platelets   


 


Polychromasia   


 


Hypochromasia (manual)   


 


Poikilocytosis (manual   


 


Anisocytosis (manual)   


 


Ovalocytes   


 


PT   


 


INR   


 


APTT   


 


Puncture Site  Rr  


 


pCO2  53 H  


 


pO2  498 H  


 


HCO3  23.5  


 


ABG pH  7.29 L  


 


ABG Total CO2  27.1  


 


ABG O2 Saturation  100.4 H  


 


ABG Base Excess  -1.8  


 


ABG Hemoglobin  13.3  


 


ABG Carboxyhemoglobin  1.6 H  


 


POC ABG HHb (Measured)  -0.4 L  


 


ABG Methemoglobin  1.5  


 


Rolan Test  Pos  


 


A-a O2 Difference  149.0  


 


Respiratory Index  0.3  


 


Hgb O2 Saturation  97.3  


 


Vent Mode  Bipap  


 


FiO2  100.0  


 


Inspiratory BiPAP  12  


 


Expiratory BiPAP  6  


 


Sodium   


 


Potassium   


 


Chloride   


 


Carbon Dioxide   


 


Anion Gap   


 


BUN   


 


Creatinine   


 


Est GFR ( Amer)   


 


Est GFR (Non-Af Amer)   


 


Random Glucose   


 


Lactic Acid   


 


Calcium   


 


Total Bilirubin   


 


AST   


 


ALT   


 


Alkaline Phosphatase   


 


Total Creatine Kinase    110


 


CK-MB (Mass)    2.14


 


Troponin I    0.0160


 


NT-Pro-B Natriuret Pep   


 


Total Protein   


 


Albumin   


 


Globulin   


 


Albumin/Globulin Ratio   


 


Urine Color   Straw 


 


Urine Clarity   Clear 


 


Urine pH   5.0 


 


Ur Specific Gravity   1.010 


 


Urine Protein   Negative 


 


Urine Glucose (UA)   1+ H 


 


Urine Ketones   Negative 


 


Urine Blood   Negative 


 


Urine Nitrate   Negative 


 


Urine Bilirubin   Negative 


 


Urine Urobilinogen   Normal 


 


Ur Leukocyte Esterase   Neg 


 


Urine WBC (Auto)   < 1 


 


Urine RBC (Auto)   1 


 


Ur Squamous Epith Cells   < 1 


 


Influenza Typ A,B (EIA)   














  18





  08:18 11:40 11:40


 


WBC   6.1  D 


 


RBC   4.44 


 


Hgb   13.8 


 


Hct   39.9 


 


MCV   89.9 


 


MCH   31.2 H 


 


MCHC   34.6 


 


RDW   14.6 H 


 


Plt Count   216 


 


MPV   9.6 


 


Neut % (Auto)   90.9 H 


 


Lymph % (Auto)   7.6 L 


 


Mono % (Auto)   1.2 


 


Eos % (Auto)   0.1 


 


Baso % (Auto)   0.2 


 


Neut #   5.6 


 


Lymph #   0.5 L 


 


Mono #   0.1 


 


Eos #   0.0 


 


Baso #   0.0 


 


Neutrophils % (Manual)   91 H 


 


Band Neutrophils %   1 


 


Lymphocytes % (Manual)   6 L 


 


Monocytes % (Manual)   2 


 


Toxic Granulation   Present 


 


Platelet Estimate   Normal 


 


Large Platelets   Present 


 


Giant Platelets   Present 


 


Polychromasia   Slight 


 


Hypochromasia (manual)   Slight 


 


Poikilocytosis (manual   Slight 


 


Anisocytosis (manual)   Slight 


 


Ovalocytes   Slight 


 


PT   


 


INR   


 


APTT   


 


Puncture Site   


 


pCO2   


 


pO2   


 


HCO3   


 


ABG pH   


 


ABG Total CO2   


 


ABG O2 Saturation   


 


ABG Base Excess   


 


ABG Hemoglobin   


 


ABG Carboxyhemoglobin   


 


POC ABG HHb (Measured)   


 


ABG Methemoglobin   


 


Rolan Test   


 


A-a O2 Difference   


 


Respiratory Index   


 


Hgb O2 Saturation   


 


Vent Mode   


 


FiO2   


 


Inspiratory BiPAP   


 


Expiratory BiPAP   


 


Sodium    136


 


Potassium    4.1


 


Chloride    98


 


Carbon Dioxide    26


 


Anion Gap    17


 


BUN    19


 


Creatinine    0.6 L


 


Est GFR ( Amer)    > 60


 


Est GFR (Non-Af Amer)    > 60


 


Random Glucose    165 H


 


Lactic Acid   


 


Calcium    9.3


 


Total Bilirubin    0.5


 


AST    31


 


ALT    22


 


Alkaline Phosphatase    47


 


Total Creatine Kinase  194 H  


 


CK-MB (Mass)  8.69 H  


 


Troponin I  1.3700 H*  


 


NT-Pro-B Natriuret Pep   


 


Total Protein    7.4


 


Albumin    4.2


 


Globulin    3.2


 


Albumin/Globulin Ratio    1.3


 


Urine Color   


 


Urine Clarity   


 


Urine pH   


 


Ur Specific Gravity   


 


Urine Protein   


 


Urine Glucose (UA)   


 


Urine Ketones   


 


Urine Blood   


 


Urine Nitrate   


 


Urine Bilirubin   


 


Urine Urobilinogen   


 


Ur Leukocyte Esterase   


 


Urine WBC (Auto)   


 


Urine RBC (Auto)   


 


Ur Squamous Epith Cells   


 


Influenza Typ A,B (EIA)   














  18





  13:52 13:52 Unknown


 


WBC   


 


RBC   


 


Hgb   


 


Hct   


 


MCV   


 


MCH   


 


MCHC   


 


RDW   


 


Plt Count   


 


MPV   


 


Neut % (Auto)   


 


Lymph % (Auto)   


 


Mono % (Auto)   


 


Eos % (Auto)   


 


Baso % (Auto)   


 


Neut #   


 


Lymph #   


 


Mono #   


 


Eos #   


 


Baso #   


 


Neutrophils % (Manual)   


 


Band Neutrophils %   


 


Lymphocytes % (Manual)   


 


Monocytes % (Manual)   


 


Toxic Granulation   


 


Platelet Estimate   


 


Large Platelets   


 


Giant Platelets   


 


Polychromasia   


 


Hypochromasia (manual)   


 


Poikilocytosis (manual   


 


Anisocytosis (manual)   


 


Ovalocytes   


 


PT   


 


INR   


 


APTT   


 


Puncture Site   


 


pCO2   


 


pO2   


 


HCO3   


 


ABG pH   


 


ABG Total CO2   


 


ABG O2 Saturation   


 


ABG Base Excess   


 


ABG Hemoglobin   


 


ABG Carboxyhemoglobin   


 


POC ABG HHb (Measured)   


 


ABG Methemoglobin   


 


Rolan Test   


 


A-a O2 Difference   


 


Respiratory Index   


 


Hgb O2 Saturation   


 


Vent Mode   


 


FiO2   


 


Inspiratory BiPAP   


 


Expiratory BiPAP   


 


Sodium   


 


Potassium   


 


Chloride   


 


Carbon Dioxide   


 


Anion Gap   


 


BUN   


 


Creatinine   


 


Est GFR ( Amer)   


 


Est GFR (Non-Af Amer)   


 


Random Glucose   


 


Lactic Acid   4.9 H* 


 


Calcium   


 


Total Bilirubin   


 


AST   


 


ALT   


 


Alkaline Phosphatase   


 


Total Creatine Kinase  273 H  


 


CK-MB (Mass)  9.93 H  


 


Troponin I  1.6500 H*  


 


NT-Pro-B Natriuret Pep   


 


Total Protein   


 


Albumin   


 


Globulin   


 


Albumin/Globulin Ratio   


 


Urine Color   


 


Urine Clarity   


 


Urine pH   


 


Ur Specific Gravity   


 


Urine Protein   


 


Urine Glucose (UA)   


 


Urine Ketones   


 


Urine Blood   


 


Urine Nitrate   


 


Urine Bilirubin   


 


Urine Urobilinogen   


 


Ur Leukocyte Esterase   


 


Urine WBC (Auto)   


 


Urine RBC (Auto)   


 


Ur Squamous Epith Cells   


 


Influenza Typ A,B (EIA)    Negative for flu a/b

## 2018-01-01 NOTE — RAD
HISTORY:

dyspnea  



COMPARISON:

Comparison is made with 12/12/2017 



FINDINGS:



LUNGS:

No evidence of significant interval change in the lungs noted since 

the previous exam.  Re- demonstrated is hyperinflation of the lungs



PLEURA:

No significant pleural effusion identified, no pneumothorax apparent.



CARDIOVASCULAR:

The cardiac silhouette is mildly enlarged.



OSSEOUS STRUCTURES:

No significant abnormalities.



VISUALIZED UPPER ABDOMEN:

Normal.



OTHER FINDINGS:

None.



IMPRESSION:

No active disease. No significant interval change.

## 2018-01-01 NOTE — CP.PCM.HP
<Lui Candelaria - Last Filed: 18 02:17>





History of Present Illness





- History of Present Illness


History of Present Illness: 





PGY-1 H&P for Dr. Garces





CC: Shortness of breath





This is a 77 year old male with PMHx COPD, asthma, BPH, HTN, HLD who presented 

to the ED in respiratory distress. Patient is unable to communicate with me at 

this time due to his dyspnea. Per ED documentation, patient was short of breath 

for 8 hours prior to arrival. He was placed on BiPAP upon arrival to the ED.





Per Review of EMR:


PMHx: COPD, Asthma, BPH, HTN, Hyperlipidemia


PSHx: Appendectomy, Prostate Surgery


Social: Denies current or past use of cigarettes, drugs, and alcohol. Worked 

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 

harmful toxins that precipitated his asthma/COPD. Lives alone.


Family History: Father,  at 74,  of "stomach pain and natural causes

", Mother,  at 74


Allergies: Seasonal NKDA








Present on Admission





- Present on Admission


Any Indicators Present on Admission: No





Review of Systems





- Review of Systems


Systems not reviewed;Unavailable: Acuity of Condition





Past Patient History





- Infectious Disease


Hx of Infectious Diseases: None





- Past Medical History & Family History


Past Medical History?: Yes





- Past Social History


Smoking Status: Never Smoked





- CARDIAC


Hx Atrial Fibrillation: No


Hx Cardia Arrhythmia: No


Hx Congestive Heart Failure: No


Hx Hypercholesterolemia: Yes


Hx Hypertension: Yes





- PULMONARY


Hx Asthma: Yes


Hx Chronic Obstructive Pulmonary Disease (COPD): Yes





- NEUROLOGICAL


Hx Neurological Disorder: No





- HEENT


Hx HEENT Problems: Yes


Other/Comment: wear eyeglasses





- RENAL


Hx Chronic Kidney Disease: No





- ENDOCRINE/METABOLIC


Hx Endocrine Disorders: No





- HEMATOLOGICAL/ONCOLOGICAL


Hx Blood Disorders: No





- INTEGUMENTARY


Hx Dermatological Problems: No





- MUSCULOSKELETAL/RHEUMATOLOGICAL


Hx Falls: No





- GASTROINTESTINAL


Hx Gastritis: Yes





- GENITOURINARY/GYNECOLOGICAL


Hx Genitourinary Disorders: Yes


Hx Prostate Problems: Yes





- PSYCHIATRIC


Hx Anxiety: Yes


Hx Depression: Yes


Hx Substance Use: No





- SURGICAL HISTORY


Hx Appendectomy: Yes





- ANESTHESIA


Hx Anesthesia: Yes


Hx Anesthesia Reactions: No


Hx Malignant Hyperthermia: No





Meds


Allergies/Adverse Reactions: 


 Allergies











Allergy/AdvReac Type Severity Reaction Status Date / Time


 


No Known Allergies Allergy   Verified 17 11:05














Physical Exam





- Constitutional


Appears: Chronically Ill





- Head Exam


Head Exam: ATRAUMATIC, NORMOCEPHALIC





- Eye Exam


Eye Exam: EOMI, PERRL





- ENT Exam


ENT Exam: Mucous Membranes Moist





- Respiratory Exam


Respiratory Exam: Rales (left sided), Wheezes (bilaterally but left>right)





- Cardiovascular Exam


Cardiovascular Exam: REGULAR RHYTHM, +S1, +S2





- GI/Abdominal Exam


GI & Abdominal Exam: Normal Bowel Sounds, Soft.  absent: Distended, Tenderness





- Extremities Exam


Extremities exam: Positive for: pedal pulses present.  Negative for: pedal edema

, tenderness





- Neurological Exam


Neurological exam: Alert, Oriented x3





- Psychiatric Exam


Psychiatric exam: Anxious





- Skin


Skin Exam: Dry, Warm





Results





- Vital Signs


Recent Vital Signs: 





 Last Vital Signs











Temp  98.5 F   18 02:09


 


Pulse  92 H  18 02:09


 


Resp  20   18 02:09


 


BP  138/89   18 02:09


 


Pulse Ox  100   18 02:09














- Labs


Result Diagrams: 


 17 23:27





 17 23:27


Labs: 





 Laboratory Results - last 24 hr











  17





  23:27 23:27 23:27


 


WBC   15.4 H D 


 


RBC   4.38 L 


 


Hgb   13.5 


 


Hct   39.3 


 


MCV   89.6 


 


MCH   30.8 


 


MCHC   34.4 


 


RDW   14.3 


 


Plt Count   257 


 


MPV   9.1 


 


Neut % (Auto)   55.0 


 


Lymph % (Auto)   24.6 


 


Mono % (Auto)   7.6 


 


Eos % (Auto)   11.9 H 


 


Baso % (Auto)   0.9 


 


Neut #   8.5 H 


 


Lymph #   3.8 


 


Mono #   1.2 H 


 


Eos #   1.8 H 


 


Baso #   0.1 


 


PT    10.6


 


INR    1.0


 


APTT    30


 


Puncture Site   


 


pCO2   


 


pO2   


 


HCO3   


 


ABG pH   


 


ABG Total CO2   


 


ABG O2 Saturation   


 


ABG Base Excess   


 


ABG Hemoglobin   


 


ABG Carboxyhemoglobin   


 


POC ABG HHb (Measured)   


 


ABG Methemoglobin   


 


Rolan Test   


 


A-a O2 Difference   


 


Respiratory Index   


 


Hgb O2 Saturation   


 


Vent Mode   


 


FiO2   


 


Inspiratory BiPAP   


 


Expiratory BiPAP   


 


Sodium  138  


 


Potassium  3.9  


 


Chloride  101  


 


Carbon Dioxide  27  


 


Anion Gap  14  


 


BUN  18  


 


Creatinine  0.5 L  


 


Est GFR ( Amer)  > 60  


 


Est GFR (Non-Af Amer)  > 60  


 


Random Glucose  205 H  


 


Calcium  9.0  


 


Total Bilirubin  0.5  


 


AST  19  


 


ALT  30  


 


Alkaline Phosphatase  47  


 


Total Creatine Kinase  113  


 


CK-MB (Mass)  2.12  


 


Troponin I  0.0150  


 


NT-Pro-B Natriuret Pep  317  


 


Total Protein  7.7  


 


Albumin  4.3  


 


Globulin  3.4  


 


Albumin/Globulin Ratio  1.3  


 


Urine Color   


 


Urine Clarity   


 


Urine pH   


 


Ur Specific Gravity   


 


Urine Protein   


 


Urine Glucose (UA)   


 


Urine Ketones   


 


Urine Blood   


 


Urine Nitrate   


 


Urine Bilirubin   


 


Urine Urobilinogen   


 


Ur Leukocyte Esterase   


 


Urine WBC (Auto)   


 


Urine RBC (Auto)   


 


Ur Squamous Epith Cells   














  17





  23:35 23:46 01:44


 


WBC   


 


RBC   


 


Hgb   


 


Hct   


 


MCV   


 


MCH   


 


MCHC   


 


RDW   


 


Plt Count   


 


MPV   


 


Neut % (Auto)   


 


Lymph % (Auto)   


 


Mono % (Auto)   


 


Eos % (Auto)   


 


Baso % (Auto)   


 


Neut #   


 


Lymph #   


 


Mono #   


 


Eos #   


 


Baso #   


 


PT   


 


INR   


 


APTT   


 


Puncture Site  Rr  


 


pCO2  53 H  


 


pO2  498 H  


 


HCO3  23.5  


 


ABG pH  7.29 L  


 


ABG Total CO2  27.1  


 


ABG O2 Saturation  100.4 H  


 


ABG Base Excess  -1.8  


 


ABG Hemoglobin  13.3  


 


ABG Carboxyhemoglobin  1.6 H  


 


POC ABG HHb (Measured)  -0.4 L  


 


ABG Methemoglobin  1.5  


 


Rolan Test  Pos  


 


A-a O2 Difference  149.0  


 


Respiratory Index  0.3  


 


Hgb O2 Saturation  97.3  


 


Vent Mode  Bipap  


 


FiO2  100.0  


 


Inspiratory BiPAP  12  


 


Expiratory BiPAP  6  


 


Sodium   


 


Potassium   


 


Chloride   


 


Carbon Dioxide   


 


Anion Gap   


 


BUN   


 


Creatinine   


 


Est GFR ( Amer)   


 


Est GFR (Non-Af Amer)   


 


Random Glucose   


 


Calcium   


 


Total Bilirubin   


 


AST   


 


ALT   


 


Alkaline Phosphatase   


 


Total Creatine Kinase    110


 


CK-MB (Mass)    2.14


 


Troponin I    0.0160


 


NT-Pro-B Natriuret Pep   


 


Total Protein   


 


Albumin   


 


Globulin   


 


Albumin/Globulin Ratio   


 


Urine Color   Straw 


 


Urine Clarity   Clear 


 


Urine pH   5.0 


 


Ur Specific Gravity   1.010 


 


Urine Protein   Negative 


 


Urine Glucose (UA)   1+ H 


 


Urine Ketones   Negative 


 


Urine Blood   Negative 


 


Urine Nitrate   Negative 


 


Urine Bilirubin   Negative 


 


Urine Urobilinogen   Normal 


 


Ur Leukocyte Esterase   Neg 


 


Urine WBC (Auto)   < 1 


 


Urine RBC (Auto)   1 


 


Ur Squamous Epith Cells   < 1 














Assessment & Plan





- Assessment and Plan (Free Text)


Plan: 





COPD Exacerbation


Patient on BiPAP in ED IPAP/EPAP , FiO2 50%


Solumedrol 60 Q6H


Duoneb Q6H JENY


Singulair 10 mg PO HS


f/u AM ABG





History of HTN


home med Lisinopril 10 po daily


home med HCTZ 12.5 mg PO daily





History of HLD


home med Crestor 10 mg PO HS





History of BPH


home med Finasteride 5 mg PO daily





Prophylactic Measure


Heart healthy diet


Heparin 5000U SC Q8


Protonix 40 mg PO daily


f/u HEMLAATHA x2





Case DW Dr. Mili Nolened PGY-1





<Iggy Garces - Last Filed: 18 05:29>





Results





- Vital Signs


Recent Vital Signs: 





 Last Vital Signs











Temp  97.8 F   18 02:54


 


Pulse  71   18 04:00


 


Resp  20   18 02:54


 


BP  122/79   18 02:54


 


Pulse Ox  97   18 02:54














- Labs


Result Diagrams: 


 17 23:27





 17 23:27


Labs: 





 Laboratory Results - last 24 hr











  17





  23:27 23:27 23:27


 


WBC   15.4 H D 


 


RBC   4.38 L 


 


Hgb   13.5 


 


Hct   39.3 


 


MCV   89.6 


 


MCH   30.8 


 


MCHC   34.4 


 


RDW   14.3 


 


Plt Count   257 


 


MPV   9.1 


 


Neut % (Auto)   55.0 


 


Lymph % (Auto)   24.6 


 


Mono % (Auto)   7.6 


 


Eos % (Auto)   11.9 H 


 


Baso % (Auto)   0.9 


 


Neut #   8.5 H 


 


Lymph #   3.8 


 


Mono #   1.2 H 


 


Eos #   1.8 H 


 


Baso #   0.1 


 


PT    10.6


 


INR    1.0


 


APTT    30


 


Puncture Site   


 


pCO2   


 


pO2   


 


HCO3   


 


ABG pH   


 


ABG Total CO2   


 


ABG O2 Saturation   


 


ABG Base Excess   


 


ABG Hemoglobin   


 


ABG Carboxyhemoglobin   


 


POC ABG HHb (Measured)   


 


ABG Methemoglobin   


 


Rolan Test   


 


A-a O2 Difference   


 


Respiratory Index   


 


Hgb O2 Saturation   


 


Vent Mode   


 


FiO2   


 


Inspiratory BiPAP   


 


Expiratory BiPAP   


 


Sodium  138  


 


Potassium  3.9  


 


Chloride  101  


 


Carbon Dioxide  27  


 


Anion Gap  14  


 


BUN  18  


 


Creatinine  0.5 L  


 


Est GFR ( Amer)  > 60  


 


Est GFR (Non-Af Amer)  > 60  


 


Random Glucose  205 H  


 


Calcium  9.0  


 


Total Bilirubin  0.5  


 


AST  19  


 


ALT  30  


 


Alkaline Phosphatase  47  


 


Total Creatine Kinase  113  


 


CK-MB (Mass)  2.12  


 


Troponin I  0.0150  


 


NT-Pro-B Natriuret Pep  317  


 


Total Protein  7.7  


 


Albumin  4.3  


 


Globulin  3.4  


 


Albumin/Globulin Ratio  1.3  


 


Urine Color   


 


Urine Clarity   


 


Urine pH   


 


Ur Specific Gravity   


 


Urine Protein   


 


Urine Glucose (UA)   


 


Urine Ketones   


 


Urine Blood   


 


Urine Nitrate   


 


Urine Bilirubin   


 


Urine Urobilinogen   


 


Ur Leukocyte Esterase   


 


Urine WBC (Auto)   


 


Urine RBC (Auto)   


 


Ur Squamous Epith Cells   














  17





  23:35 23:46 01:44


 


WBC   


 


RBC   


 


Hgb   


 


Hct   


 


MCV   


 


MCH   


 


MCHC   


 


RDW   


 


Plt Count   


 


MPV   


 


Neut % (Auto)   


 


Lymph % (Auto)   


 


Mono % (Auto)   


 


Eos % (Auto)   


 


Baso % (Auto)   


 


Neut #   


 


Lymph #   


 


Mono #   


 


Eos #   


 


Baso #   


 


PT   


 


INR   


 


APTT   


 


Puncture Site  Rr  


 


pCO2  53 H  


 


pO2  498 H  


 


HCO3  23.5  


 


ABG pH  7.29 L  


 


ABG Total CO2  27.1  


 


ABG O2 Saturation  100.4 H  


 


ABG Base Excess  -1.8  


 


ABG Hemoglobin  13.3  


 


ABG Carboxyhemoglobin  1.6 H  


 


POC ABG HHb (Measured)  -0.4 L  


 


ABG Methemoglobin  1.5  


 


Rolan Test  Pos  


 


A-a O2 Difference  149.0  


 


Respiratory Index  0.3  


 


Hgb O2 Saturation  97.3  


 


Vent Mode  Bipap  


 


FiO2  100.0  


 


Inspiratory BiPAP  12  


 


Expiratory BiPAP  6  


 


Sodium   


 


Potassium   


 


Chloride   


 


Carbon Dioxide   


 


Anion Gap   


 


BUN   


 


Creatinine   


 


Est GFR ( Amer)   


 


Est GFR (Non-Af Amer)   


 


Random Glucose   


 


Calcium   


 


Total Bilirubin   


 


AST   


 


ALT   


 


Alkaline Phosphatase   


 


Total Creatine Kinase    110


 


CK-MB (Mass)    2.14


 


Troponin I    0.0160


 


NT-Pro-B Natriuret Pep   


 


Total Protein   


 


Albumin   


 


Globulin   


 


Albumin/Globulin Ratio   


 


Urine Color   Straw 


 


Urine Clarity   Clear 


 


Urine pH   5.0 


 


Ur Specific Gravity   1.010 


 


Urine Protein   Negative 


 


Urine Glucose (UA)   1+ H 


 


Urine Ketones   Negative 


 


Urine Blood   Negative 


 


Urine Nitrate   Negative 


 


Urine Bilirubin   Negative 


 


Urine Urobilinogen   Normal 


 


Ur Leukocyte Esterase   Neg 


 


Urine WBC (Auto)   < 1 


 


Urine RBC (Auto)   1 


 


Ur Squamous Epith Cells   < 1 














Assessment & Plan





- Date & Time


Date: 18 (I have seen and examined the patient.  I agree with the 

findings and plan of care as documented by Dr. Candelaria.  Patient with COPD 

exacerbation.  On BIPAP.  Continue home meds. Also with history of 

hypertension.  Continue home meds.  Adjust as necessary.  Monitor for acute 

changes.)


Time: 05:28





Attending/Attestation





- Attestation


I have personally seen and examined this patient.: Yes


I have fully participated in the care of the patient.: Yes


I have reviewed all pertinent clinical information: Yes

## 2018-01-02 LAB
ALBUMIN SERPL-MCNC: 3.7 G/DL (ref 3.5–5)
ALBUMIN/GLOB SERPL: 1.2 {RATIO} (ref 1–2.1)
ALT SERPL-CCNC: 25 U/L (ref 21–72)
AST SERPL-CCNC: 26 U/L (ref 17–59)
BASOPHILS # BLD AUTO: 0 K/UL (ref 0–0.2)
BASOPHILS NFR BLD: 0.1 % (ref 0–2)
BUN SERPL-MCNC: 31 MG/DL (ref 9–20)
CALCIUM SERPL-MCNC: 8.8 MG/DL (ref 8.6–10.4)
CK MB SERPL-MCNC: 6.46 NG/ML (ref 0–3.38)
EOSINOPHIL # BLD AUTO: 0 K/UL (ref 0–0.7)
EOSINOPHIL NFR BLD: 0 % (ref 0–4)
ERYTHROCYTE [DISTWIDTH] IN BLOOD BY AUTOMATED COUNT: 14.5 % (ref 11.5–14.5)
GFR NON-AFRICAN AMERICAN: > 60
HGB BLD-MCNC: 13.2 G/DL (ref 12–18)
INR PPP: 1
LYMPHOCYTE: 7 % (ref 20–40)
LYMPHOCYTES # BLD AUTO: 1 K/UL (ref 1–4.3)
LYMPHOCYTES NFR BLD AUTO: 6.2 % (ref 20–40)
MCH RBC QN AUTO: 30.6 PG (ref 27–31)
MCHC RBC AUTO-ENTMCNC: 34 G/DL (ref 33–37)
MCV RBC AUTO: 90 FL (ref 80–94)
MONOCYTE: 5 % (ref 0–10)
MONOCYTES # BLD: 0.6 K/UL (ref 0–0.8)
MONOCYTES NFR BLD: 3.6 % (ref 0–10)
NEUTROPHILS # BLD: 14.3 K/UL (ref 1.8–7)
NEUTROPHILS NFR BLD AUTO: 88 % (ref 50–75)
NEUTROPHILS NFR BLD AUTO: 90.1 % (ref 50–75)
NRBC BLD AUTO-RTO: 0 % (ref 0–2)
PLATELET # BLD EST: NORMAL 10*3/UL
PLATELET # BLD: 236 K/UL (ref 130–400)
PMV BLD AUTO: 9.2 FL (ref 7.2–11.7)
PROTHROMBIN TIME: 11.5 SECONDS (ref 9.7–12.2)
RBC # BLD AUTO: 4.3 MIL/UL (ref 4.4–5.9)
RBC MORPH BLD: NORMAL
TOTAL CELLS COUNTED BLD: 100
TROPONIN I SERPL-MCNC: 0.95 NG/ML (ref 0–0.12)
WBC # BLD AUTO: 15.9 K/UL (ref 4.8–10.8)

## 2018-01-02 RX ADMIN — TAZOBACTAM SODIUM AND PIPERACILLIN SODIUM SCH MLS/HR: 375; 3 INJECTION, SOLUTION INTRAVENOUS at 22:17

## 2018-01-02 RX ADMIN — PANTOPRAZOLE SODIUM SCH MG: 40 TABLET, DELAYED RELEASE ORAL at 10:26

## 2018-01-02 RX ADMIN — IPRATROPIUM BROMIDE AND ALBUTEROL SULFATE SCH ML: .5; 3 SOLUTION RESPIRATORY (INHALATION) at 07:06

## 2018-01-02 RX ADMIN — IPRATROPIUM BROMIDE AND ALBUTEROL SULFATE SCH ML: .5; 3 SOLUTION RESPIRATORY (INHALATION) at 20:48

## 2018-01-02 RX ADMIN — TAZOBACTAM SODIUM AND PIPERACILLIN SODIUM SCH MLS/HR: 375; 3 INJECTION, SOLUTION INTRAVENOUS at 04:12

## 2018-01-02 RX ADMIN — TAZOBACTAM SODIUM AND PIPERACILLIN SODIUM SCH MLS/HR: 375; 3 INJECTION, SOLUTION INTRAVENOUS at 10:37

## 2018-01-02 RX ADMIN — METHYLPREDNISOLONE SODIUM SUCCINATE SCH MG: 40 INJECTION, POWDER, FOR SOLUTION INTRAMUSCULAR; INTRAVENOUS at 17:36

## 2018-01-02 RX ADMIN — IPRATROPIUM BROMIDE AND ALBUTEROL SULFATE SCH ML: .5; 3 SOLUTION RESPIRATORY (INHALATION) at 02:07

## 2018-01-02 RX ADMIN — IPRATROPIUM BROMIDE AND ALBUTEROL SULFATE SCH ML: .5; 3 SOLUTION RESPIRATORY (INHALATION) at 13:17

## 2018-01-02 RX ADMIN — TAZOBACTAM SODIUM AND PIPERACILLIN SODIUM SCH MLS/HR: 375; 3 INJECTION, SOLUTION INTRAVENOUS at 17:36

## 2018-01-02 NOTE — CARD
--------------- APPROVED REPORT --------------





EKG Measurement

Heart [id number]

[id number]

[id number]

[id number]



<Conclusion>

Atrial fibrillation

Nonspecific ST abnormality

Abnormal ECG

## 2018-01-02 NOTE — CP.PCM.PN
Subjective





- Date & Time of Evaluation


Date of Evaluation: 01/02/18


Time of Evaluation: 09:00





- Subjective


Subjective: 





patient seen and examined


Breathing much improved


Complaining of slight cough


Afebrile


Being treated for COPD exacerbation








Objective





- Vital Signs/Intake and Output


Vital Signs (last 24 hours): 


 











Temp Pulse Resp BP Pulse Ox


 


 97.6 F   63   20   117/67   95 


 


 01/02/18 08:06  01/02/18 08:06  01/02/18 08:06  01/02/18 08:06  01/02/18 08:06








Intake and Output: 


 











 01/02/18 01/02/18





 06:59 18:59


 


Output Total 200 


 


Balance -200 














- Medications


Medications: 


 Current Medications





Albuterol/Ipratropium (Duoneb 3 Mg/0.5 Mg (3 Ml) Ud)  3 ml INH RQ6 [facility]


   Last Admin: 01/02/18 13:17 Dose:  3 ml


Aspirin (Ecotrin)  81 mg PO DAILY [facility]


   Last Admin: 01/02/18 10:25 Dose:  81 mg


Finasteride (Proscar)  5 mg PO DAILY [facility]


   Last Admin: 01/02/18 10:25 Dose:  5 mg


Hydrochlorothiazide (Microzide)  12.5 mg PO DAILY [facility]


   Last Admin: 01/02/18 10:25 Dose:  12.5 mg


Piperacillin Sod/Tazobactam Sod (Zosyn 3.375 Gm Iv Premix)  3.375 gm in 50 mls 

@ 100 mls/hr IVPB Q6H [facility]


   Last Admin: 01/02/18 10:37 Dose:  100 mls/hr


Heparin Sodium/Sodium Chloride (Heparin 20452 Units/250ml 1/2 Normal Saline)  25

,000 units in 250 mls @ 9.335 mls/hr IV .Q24H ONE; 14 UNITS/KG/HR


   PRN Reason: Protocol


   Stop: 01/03/18 02:59


   Last Admin: 01/02/18 03:48 Dose:  14 units/kg/hr, 9.335 mls/hr


Lisinopril (Zestril)  10 mg PO DAILY [facility]


   Last Admin: 01/02/18 10:25 Dose:  10 mg


Methylprednisolone (Solu-Medrol)  60 mg IV Q8H [facility]


   Last Admin: 01/02/18 10:34 Dose:  60 mg


Montelukast Sodium (Singulair)  10 mg PO HS [facility]


   Last Admin: 01/01/18 22:01 Dose:  10 mg


Pantoprazole Sodium (Protonix Ec Tab)  40 mg PO DAILY [facility]


   Last Admin: 01/02/18 10:26 Dose:  40 mg


Pneumococcal Polyvalent Vaccine (Pneumovax 23 Vaccine)  0.5 ml IM .ONCE ONE


   Stop: 01/04/18 14:01


Rosuvastatin Calcium (Crestor)  10 mg PO [facility]


   Last Admin: 01/01/18 22:01 Dose:  10 mg











- Labs


Labs: 


 





 01/02/18 07:37 





 01/02/18 07:37 





 











PT  11.5 SECONDS (9.7-12.2)   01/02/18  01:57    


 


INR  1.0   01/02/18  01:57    


 


APTT  47 SECONDS (21-34)  H  01/02/18  11:56

## 2018-01-02 NOTE — CP.PCM.PN
Subjective





- Date & Time of Evaluation


Date of Evaluation: 01/02/18


Time of Evaluation: 15:40





- Subjective


Subjective: 





patient has no current chest pain.  was scheduled for cardiac cath but there is 

an equipment malfunction.





Objective





- Vital Signs/Intake and Output


Vital Signs (last 24 hours): 


 











Temp Pulse Resp BP Pulse Ox


 


 97.6 F   63   20   117/67   95 


 


 01/02/18 08:06  01/02/18 08:06  01/02/18 08:06  01/02/18 08:06  01/02/18 08:06








Intake and Output: 


 











 01/02/18 01/02/18





 06:59 18:59


 


Output Total 200 


 


Balance -200 














- Medications


Medications: 


 Current Medications





Albuterol/Ipratropium (Duoneb 3 Mg/0.5 Mg (3 Ml) Ud)  3 ml INH RQ6 [facility]


   Last Admin: 01/02/18 13:17 Dose:  3 ml


Aspirin (Ecotrin)  81 mg PO DAILY [facility]


   Last Admin: 01/02/18 10:25 Dose:  81 mg


Finasteride (Proscar)  5 mg PO DAILY [facility]


   Last Admin: 01/02/18 10:25 Dose:  5 mg


Hydrochlorothiazide (Microzide)  12.5 mg PO DAILY [facility]


   Last Admin: 01/02/18 10:25 Dose:  12.5 mg


Piperacillin Sod/Tazobactam Sod (Zosyn 3.375 Gm Iv Premix)  3.375 gm in 50 mls 

@ 100 mls/hr IVPB Q6H [facility]


   Last Admin: 01/02/18 10:37 Dose:  100 mls/hr


Heparin Sodium/Sodium Chloride (Heparin 59298 Units/250ml 1/2 Normal Saline)  25

,000 units in 250 mls @ 9.335 mls/hr IV .Q24H ONE; 14 UNITS/KG/HR


   PRN Reason: Protocol


   Stop: 01/03/18 02:59


   Last Admin: 01/02/18 03:48 Dose:  14 units/kg/hr, 9.335 mls/hr


Lisinopril (Zestril)  10 mg PO DAILY [facility]


   Last Admin: 01/02/18 10:25 Dose:  10 mg


Methylprednisolone (Solu-Medrol)  60 mg IV Q8H [facility]


   Last Admin: 01/02/18 10:34 Dose:  60 mg


Montelukast Sodium (Singulair)  10 mg PO HS [facility]


   Last Admin: 01/01/18 22:01 Dose:  10 mg


Pantoprazole Sodium (Protonix Ec Tab)  40 mg PO DAILY [facility]


   Last Admin: 01/02/18 10:26 Dose:  40 mg


Pneumococcal Polyvalent Vaccine (Pneumovax 23 Vaccine)  0.5 ml IM .ONCE ONE


   Stop: 01/04/18 14:01


Rosuvastatin Calcium (Crestor)  10 mg PO [facility]


   Last Admin: 01/01/18 22:01 Dose:  10 mg











- Labs


Labs: 


 





 01/02/18 07:37 





 01/02/18 07:37 





 











PT  11.5 SECONDS (9.7-12.2)   01/02/18  01:57    


 


INR  1.0   01/02/18  01:57    


 


APTT  47 SECONDS (21-34)  H  01/02/18  11:56    














- Constitutional


Appears: Non-toxic





- Head Exam


Head Exam: NORMAL INSPECTION





- Eye Exam


Eye Exam: Normal appearance





- ENT Exam


ENT Exam: Mucous Membranes Moist





- Neck Exam


Neck Exam: Full ROM





- Respiratory Exam


Respiratory Exam: NORMAL BREATHING PATTERN





- Cardiovascular Exam


Cardiovascular Exam: REGULAR RHYTHM





- GI/Abdominal Exam


GI & Abdominal Exam: Normal Bowel Sounds





- Rectal Exam


Rectal Exam: Deferred





- Extremities Exam


Extremities Exam: Pedal Edema





- Back Exam


Back Exam: NORMAL INSPECTION





- Neurological Exam


Neurological Exam: Alert





- Psychiatric Exam


Psychiatric exam: Normal Affect





- Skin


Skin Exam: Normal Color





Assessment and Plan


(1) NSTEMI (non-ST elevated myocardial infarction)


Assessment & Plan: 


cardiac cath will be deferred at this time.  I will discuss timing, but may 

need to transfer to a tertiary care hospital.


Status: Acute   





(2) HTN (hypertension)


Status: Chronic   





(3) Hyperlipidemia


Status: Chronic

## 2018-01-02 NOTE — CP.PCM.PN
Addendum entered and electronically signed by Marybel Fuentes  01/02/18 12:36

: 


Cardiac Cath postponed due to lack of staff in cath lab. 





Original Note:








<Marybel Fuentes - Last Filed: 01/02/18 10:32>





Subjective





- Date & Time of Evaluation


Date of Evaluation: 01/02/18


Time of Evaluation: 07:00





- Subjective


Subjective: 





PGY1 Medicine Note for Dr. Palomino





Patient seen and examined at bedside and in no acute distress. Patient has no 

complaints. Patient denies headache, shortness of breath, chest pain, abdominal 

pain, nausea, vomiting, constipation or diarrhea. Patient is NPO and to go for 

cardiac cath today. 





Objective





- Vital Signs/Intake and Output


Vital Signs (last 24 hours): 


 











Temp Pulse Resp BP Pulse Ox


 


 97.6 F   63   20   117/67   95 


 


 01/02/18 08:06  01/02/18 08:06  01/02/18 08:06  01/02/18 08:06  01/02/18 08:06








Intake and Output: 


 











 01/02/18 01/02/18





 06:59 18:59


 


Output Total 200 


 


Balance -200 














- Medications


Medications: 


 Current Medications





Albuterol/Ipratropium (Duoneb 3 Mg/0.5 Mg (3 Ml) Ud)  3 ml INH RQ6 [facility]


   Last Admin: 01/02/18 07:06 Dose:  3 ml


Aspirin (Ecotrin)  81 mg PO DAILY [facility]


   Last Admin: 01/01/18 10:03 Dose:  81 mg


Finasteride (Proscar)  5 mg PO DAILY [facility]


   Last Admin: 01/01/18 10:03 Dose:  5 mg


Hydrochlorothiazide (Microzide)  12.5 mg PO DAILY [facility]


   Last Admin: 01/01/18 10:03 Dose:  12.5 mg


Piperacillin Sod/Tazobactam Sod (Zosyn 3.375 Gm Iv Premix)  3.375 gm in 50 mls 

@ 100 mls/hr IVPB Q6H [facility]


   Last Admin: 01/02/18 04:12 Dose:  100 mls/hr


Heparin Sodium/Sodium Chloride (Heparin 01610 Units/250ml 1/2 Normal Saline)  25

,000 units in 250 mls @ 9.335 mls/hr IV .Q24H ONE; 14 UNITS/KG/HR


   PRN Reason: Protocol


   Stop: 01/03/18 02:59


   Last Admin: 01/02/18 03:48 Dose:  14 units/kg/hr, 9.335 mls/hr


Lisinopril (Zestril)  10 mg PO DAILY [facility]


   Last Admin: 01/01/18 10:03 Dose:  10 mg


Methylprednisolone (Solu-Medrol)  60 mg IV Q8H [facility]


   Last Admin: 01/02/18 02:01 Dose:  60 mg


Montelukast Sodium (Singulair)  10 mg PO [facility]


   Last Admin: 01/01/18 22:01 Dose:  10 mg


Pantoprazole Sodium (Protonix Ec Tab)  40 mg PO DAILY [facility]


   Last Admin: 01/01/18 10:03 Dose:  40 mg


Pneumococcal Polyvalent Vaccine (Pneumovax 23 Vaccine)  0.5 ml IM .ONCE ONE


   Stop: 01/04/18 14:01


Rosuvastatin Calcium (Crestor)  10 mg PO [facility]


   Last Admin: 01/01/18 22:01 Dose:  10 mg











- Labs


Labs: 


 





 01/02/18 07:37 





 01/02/18 07:37 





 











PT  11.5 SECONDS (9.7-12.2)   01/02/18  01:57    


 


INR  1.0   01/02/18  01:57    


 


APTT  43 SECONDS (21-34)  H D 01/02/18  01:56    














- Constitutional


Appears: Non-toxic, No Acute Distress





- Head Exam


Head Exam: ATRAUMATIC, NORMAL INSPECTION, NORMOCEPHALIC





- Eye Exam


Eye Exam: EOMI, Normal appearance





- ENT Exam


ENT Exam: Mucous Membranes Moist





- Respiratory Exam


Respiratory Exam: Clear to Ausculation Bilateral, NORMAL BREATHING PATTERN





- Cardiovascular Exam


Cardiovascular Exam: REGULAR RHYTHM, +S1, +S2





- GI/Abdominal Exam


GI & Abdominal Exam: Soft, Normal Bowel Sounds.  absent: Distended, Tenderness





- Extremities Exam


Extremities Exam: Normal Inspection.  absent: Pedal Edema





- Neurological Exam


Neurological Exam: Alert, Awake, Oriented x3





- Psychiatric Exam


Psychiatric exam: Normal Affect, Normal Mood





- Skin


Skin Exam: Intact, Normal Color, Warm





Assessment and Plan





- Assessment and Plan (Free Text)


Assessment: 


NSTEMI


-Stable, afebrile


-Troponin 0.015 -> 0.016 -> 1.370 -> 1.650


-EKG this morning showed NSR with prolonged QTc


-heparin drip


-Aspirin 81 mg daily


-Received Plavix 300mg PO x 1


-Crestor 10mg PO HS


-Cardiology on consult, Dr Chowdhury, f/u recommendations


-NPO for cardiac cath today with Dr. Chowdhury





Acute COPD Exacerbation


-Patient tolerate bipap last night


-Sating well on NC currently


-ABG this morning was reviewed 


-Solumedrol 60mg IVP Q8H, will continue to taper 


-Continue Duoneb Q6H [facility]


-Singulair 10 mg PO HS


-Supplemental O2


-Pulmonary consulted, Dr Costello, f/u recommendations 





Elevated Lactate, SOB, r/o infectious etiology


-Afebrile


-Leukocytosis from 15.4 -> 6.1 back up to 15.9 on 1/2


-Lactic acid trending down 11/2: 2.3


-Zosyn 3.375mg IV Q6H 


-Blood cx pending





History of HTN


-Continue Lisinopril 10 po daily


-Continue HCTZ 12.5 mg PO daily





History of HLD


-Continue Crestor 10 mg PO HS





History of BPH


-Continue Finasteride 5 mg PO daily





Prophylactic Measure


Heparin drip


Protonix 40 mg PO daily


NPO today for cardiac cath











<Katarzyna Palomino - Last Filed: 01/02/18 14:44>





Objective





- Vital Signs/Intake and Output


Vital Signs (last 24 hours): 


 











Temp Pulse Resp BP Pulse Ox


 


 97.6 F   63   20   117/67   95 


 


 01/02/18 08:06  01/02/18 08:06  01/02/18 08:06  01/02/18 08:06  01/02/18 08:06








Intake and Output: 


 











 01/02/18 01/02/18





 06:59 18:59


 


Output Total 200 


 


Balance -200 














- Medications


Medications: 


 Current Medications





Albuterol/Ipratropium (Duoneb 3 Mg/0.5 Mg (3 Ml) Ud)  3 ml INH RQ6 [facility]


   Last Admin: 01/02/18 13:17 Dose:  3 ml


Aspirin (Ecotrin)  81 mg PO DAILY [facility]


   Last Admin: 01/02/18 10:25 Dose:  81 mg


Finasteride (Proscar)  5 mg PO DAILY [facility]


   Last Admin: 01/02/18 10:25 Dose:  5 mg


Hydrochlorothiazide (Microzide)  12.5 mg PO DAILY [facility]


   Last Admin: 01/02/18 10:25 Dose:  12.5 mg


Piperacillin Sod/Tazobactam Sod (Zosyn 3.375 Gm Iv Premix)  3.375 gm in 50 mls 

@ 100 mls/hr IVPB Q6H [facility]


   Last Admin: 01/02/18 10:37 Dose:  100 mls/hr


Heparin Sodium/Sodium Chloride (Heparin 84211 Units/250ml 1/2 Normal Saline)  25

,000 units in 250 mls @ 9.335 mls/hr IV .Q24H ONE; 14 UNITS/KG/HR


   PRN Reason: Protocol


   Stop: 01/03/18 02:59


   Last Admin: 01/02/18 03:48 Dose:  14 units/kg/hr, 9.335 mls/hr


Lisinopril (Zestril)  10 mg PO DAILY [facility]


   Last Admin: 01/02/18 10:25 Dose:  10 mg


Methylprednisolone (Solu-Medrol)  60 mg IV Q8H [facility]


   Last Admin: 01/02/18 10:34 Dose:  60 mg


Montelukast Sodium (Singulair)  10 mg PO [facility]


   Last Admin: 01/01/18 22:01 Dose:  10 mg


Pantoprazole Sodium (Protonix Ec Tab)  40 mg PO DAILY [facility]


   Last Admin: 01/02/18 10:26 Dose:  40 mg


Pneumococcal Polyvalent Vaccine (Pneumovax 23 Vaccine)  0.5 ml IM .ONCE ONE


   Stop: 01/04/18 14:01


Rosuvastatin Calcium (Crestor)  10 mg PO [facility]


   Last Admin: 01/01/18 22:01 Dose:  10 mg











- Labs


Labs: 


 





 01/02/18 07:37 





 01/02/18 07:37 





 











PT  11.5 SECONDS (9.7-12.2)   01/02/18  01:57    


 


INR  1.0   01/02/18  01:57    


 


APTT  47 SECONDS (21-34)  H  01/02/18  11:56    














Attending/Attestation





- Attestation


I have personally seen and examined this patient.: Yes


I have fully participated in the care of the patient.: Yes


I have reviewed all pertinent clinical information, including history, physical 

exam and plan: Yes


Notes (Text): 


Patient was seen and examined,no complain,denies pain,lying comfortable ,feels 

better


We will continue his heparin,plavix and asprin


cardiac cath postponed.WE will follow up with cardiologist


d/w the resident


I agree with the documentation of the resident's assessment and the plan

## 2018-01-03 VITALS
OXYGEN SATURATION: 98 % | HEART RATE: 61 BPM | DIASTOLIC BLOOD PRESSURE: 74 MMHG | SYSTOLIC BLOOD PRESSURE: 110 MMHG | TEMPERATURE: 98.4 F

## 2018-01-03 LAB
ALBUMIN SERPL-MCNC: 3.6 G/DL (ref 3.5–5)
ALBUMIN/GLOB SERPL: 1.1 {RATIO} (ref 1–2.1)
ALT SERPL-CCNC: 27 U/L (ref 21–72)
AST SERPL-CCNC: 19 U/L (ref 17–59)
BASOPHILS # BLD AUTO: 0 K/UL (ref 0–0.2)
BASOPHILS NFR BLD: 0.1 % (ref 0–2)
BUN SERPL-MCNC: 29 MG/DL (ref 9–20)
CALCIUM SERPL-MCNC: 8.5 MG/DL (ref 8.6–10.4)
EOSINOPHIL # BLD AUTO: 0 K/UL (ref 0–0.7)
EOSINOPHIL NFR BLD: 0 % (ref 0–4)
ERYTHROCYTE [DISTWIDTH] IN BLOOD BY AUTOMATED COUNT: 14.6 % (ref 11.5–14.5)
GFR NON-AFRICAN AMERICAN: > 60
HGB BLD-MCNC: 13.5 G/DL (ref 12–18)
LYMPHOCYTES # BLD AUTO: 1.5 K/UL (ref 1–4.3)
LYMPHOCYTES NFR BLD AUTO: 13.1 % (ref 20–40)
MAGNESIUM SERPL-MCNC: 2.3 MG/DL (ref 1.6–2.3)
MCH RBC QN AUTO: 30 PG (ref 27–31)
MCHC RBC AUTO-ENTMCNC: 33.8 G/DL (ref 33–37)
MCV RBC AUTO: 88.6 FL (ref 80–94)
MONOCYTES # BLD: 1.1 K/UL (ref 0–0.8)
MONOCYTES NFR BLD: 9.5 % (ref 0–10)
NEUTROPHILS # BLD: 8.6 K/UL (ref 1.8–7)
NEUTROPHILS NFR BLD AUTO: 77.3 % (ref 50–75)
NRBC BLD AUTO-RTO: 0 % (ref 0–2)
PLATELET # BLD: 252 K/UL (ref 130–400)
PMV BLD AUTO: 9.1 FL (ref 7.2–11.7)
RBC # BLD AUTO: 4.51 MIL/UL (ref 4.4–5.9)
WBC # BLD AUTO: 11.2 K/UL (ref 4.8–10.8)

## 2018-01-03 RX ADMIN — PANTOPRAZOLE SODIUM SCH MG: 40 TABLET, DELAYED RELEASE ORAL at 09:20

## 2018-01-03 RX ADMIN — TAZOBACTAM SODIUM AND PIPERACILLIN SODIUM SCH MLS/HR: 375; 3 INJECTION, SOLUTION INTRAVENOUS at 09:21

## 2018-01-03 RX ADMIN — IPRATROPIUM BROMIDE AND ALBUTEROL SULFATE SCH ML: .5; 3 SOLUTION RESPIRATORY (INHALATION) at 07:16

## 2018-01-03 RX ADMIN — METHYLPREDNISOLONE SODIUM SUCCINATE SCH MG: 40 INJECTION, POWDER, FOR SOLUTION INTRAMUSCULAR; INTRAVENOUS at 09:20

## 2018-01-03 RX ADMIN — IPRATROPIUM BROMIDE AND ALBUTEROL SULFATE SCH ML: .5; 3 SOLUTION RESPIRATORY (INHALATION) at 13:08

## 2018-01-03 RX ADMIN — TAZOBACTAM SODIUM AND PIPERACILLIN SODIUM SCH MLS/HR: 375; 3 INJECTION, SOLUTION INTRAVENOUS at 03:15

## 2018-01-03 RX ADMIN — IPRATROPIUM BROMIDE AND ALBUTEROL SULFATE SCH: .5; 3 SOLUTION RESPIRATORY (INHALATION) at 01:51

## 2018-01-03 NOTE — CP.PCM.DIS
Provider





- Provider


Date of Admission: 


01/01/18 00:20





Attending physician: 


Katarzyna Palomino MD





Consults: 





Dr. Chowdhury (Cardio)


Dr. Costello (pulmonology)





Time Spent in preparation of Discharge (in minutes): 45





Diagnosis





- Discharge Diagnosis


(1) COPD exacerbation


Status: Resolved   





(2) NSTEMI (non-ST elevated myocardial infarction)


Status: Resolved   





(3) BPH (benign prostatic hyperplasia)


Status: Chronic   





(4) HTN (hypertension)


Status: Chronic   





(5) Hyperlipidemia


Status: Chronic   





Hospital Course





- Lab Results


Lab Results: 


 Micro Results





01/02/18 12:04   Blood   Blood Culture - Preliminary


                            NO GROWTH AFTER 24 HOURS


01/01/18 10:55   Blood   Blood Culture - Preliminary


                            NO GROWTH AFTER 48 HOURS


01/01/18 07:20   Blood   Blood Culture - Preliminary


                            NO GROWTH AFTER 24 HOURS


01/02/18 07:00   Blood   Blood Culture - Preliminary


                            NO GROWTH AFTER 24 HOURS





 Most Recent Lab Values











WBC  11.2 K/uL (4.8-10.8)  H  01/03/18  07:40    


 


RBC  4.51 Mil/uL (4.40-5.90)   01/03/18  07:40    


 


Hgb  13.5 g/dL (12.0-18.0)   01/03/18  07:40    


 


Hct  40.0 % (35.0-51.0)   01/03/18  07:40    


 


MCV  88.6 fL (80.0-94.0)   01/03/18  07:40    


 


MCH  30.0 pg (27.0-31.0)   01/03/18  07:40    


 


MCHC  33.8 g/dL (33.0-37.0)   01/03/18  07:40    


 


RDW  14.6 % (11.5-14.5)  H  01/03/18  07:40    


 


Plt Count  252 K/uL (130-400)   01/03/18  07:40    


 


MPV  9.1 fL (7.2-11.7)   01/03/18  07:40    


 


Neut % (Auto)  77.3 % (50.0-75.0)  H  01/03/18  07:40    


 


Lymph % (Auto)  13.1 % (20.0-40.0)  L  01/03/18  07:40    


 


Mono % (Auto)  9.5 % (0.0-10.0)   01/03/18  07:40    


 


Eos % (Auto)  0.0 % (0.0-4.0)   01/03/18  07:40    


 


Baso % (Auto)  0.1 % (0.0-2.0)   01/03/18  07:40    


 


Neut #  8.6 K/uL (1.8-7.0)  H  01/03/18  07:40    


 


Lymph #  1.5 K/uL (1.0-4.3)   01/03/18  07:40    


 


Mono #  1.1 K/uL (0.0-0.8)  H  01/03/18  07:40    


 


Eos #  0.0 K/uL (0.0-0.7)   01/03/18  07:40    


 


Baso #  0.0 K/uL (0.0-0.2)   01/03/18  07:40    


 


Neutrophils % (Manual)  88 % (50-75)  H  01/02/18  07:37    


 


Band Neutrophils %  1 % (0-2)   01/01/18  11:40    


 


Lymphocytes % (Manual)  7 % (20-40)  L  01/02/18  07:37    


 


Monocytes % (Manual)  5 % (0-10)   01/02/18  07:37    


 


Toxic Granulation  Present   01/01/18  11:40    


 


Platelet Estimate  Normal  (NORMAL)   01/02/18  07:37    


 


Large Platelets  Present   01/01/18  11:40    


 


Giant Platelets  Present   01/01/18  11:40    


 


RBC Morphology  Normal   01/02/18  07:37    


 


Polychromasia  Slight   01/01/18  11:40    


 


Hypochromasia (manual)  Slight   01/01/18  11:40    


 


Poikilocytosis (manual  Slight   01/01/18  11:40    


 


Anisocytosis (manual)  Slight   01/01/18  11:40    


 


Ovalocytes  Slight   01/01/18  11:40    


 


PT  11.5 SECONDS (9.7-12.2)   01/02/18  01:57    


 


INR  1.0   01/02/18  01:57    


 


APTT  47 SECONDS (21-34)  H  01/02/18  11:56    


 


Puncture Site  Rr   12/31/17  23:35    


 


pCO2  53 mm/Hg (35-45)  H  12/31/17  23:35    


 


pO2  498 mm/Hg ()  H  12/31/17  23:35    


 


HCO3  23.5 mmol/L (21-28)   12/31/17  23:35    


 


ABG pH  7.29  (7.35-7.45)  L  12/31/17  23:35    


 


ABG Total CO2  27.1 mmol/L (22-28)   12/31/17  23:35    


 


ABG O2 Saturation  100.4 % (95-98)  H  12/31/17  23:35    


 


ABG Base Excess  -1.8 mmol/L (-2.0-3.0)   12/31/17  23:35    


 


ABG Hemoglobin  13.3 g/dL (11.7-17.4)   12/31/17  23:35    


 


ABG Carboxyhemoglobin  1.6 % (0.5-1.5)  H  12/31/17  23:35    


 


POC ABG HHb (Measured)  -0.4 % (0.0-5.0)  L  12/31/17  23:35    


 


ABG Methemoglobin  1.5 % (0.0-3.0)   12/31/17  23:35    


 


Rolan Test  Pos   12/31/17  23:35    


 


A-a O2 Difference  149.0 mm/Hg  12/31/17  23:35    


 


Respiratory Index  0.3   12/31/17  23:35    


 


Hgb O2 Saturation  97.3 % (95.0-98.0)   12/31/17  23:35    


 


Vent Mode  Bipap   12/31/17  23:35    


 


FiO2  100.0 %  12/31/17  23:35    


 


Inspiratory BiPAP  12   12/31/17  23:35    


 


Expiratory BiPAP  6   12/31/17  23:35    


 


Sodium  135 mmol/L (132-148)   01/03/18  07:40    


 


Potassium  3.9 mmol/L (3.6-5.2)   01/03/18  07:40    


 


Chloride  102 mmol/L ()   01/03/18  07:40    


 


Carbon Dioxide  27 mmol/L (22-30)   01/03/18  07:40    


 


Anion Gap  10  (10-20)   01/03/18  07:40    


 


BUN  29 mg/dL (9-20)  H  01/03/18  07:40    


 


Creatinine  0.8 mg/dL (0.8-1.5)   01/03/18  07:40    


 


Est GFR ( Amer)  > 60   01/03/18  07:40    


 


Est GFR (Non-Af Amer)  > 60   01/03/18  07:40    


 


Random Glucose  100 mg/dL ()   01/03/18  07:40    


 


Lactic Acid  2.3 mmol/L (0.7-2.1)  H  01/02/18  02:33    


 


Calcium  8.5 mg/dl (8.6-10.4)  L  01/03/18  07:40    


 


Phosphorus  3.8 mg/dL (2.5-4.5)   01/03/18  07:40    


 


Magnesium  2.3 mg/dL (1.6-2.3)   01/03/18  07:40    


 


Total Bilirubin  1.0 mg/dL (0.2-1.3)   01/03/18  07:40    


 


AST  19 U/L (17-59)   01/03/18  07:40    


 


ALT  27 U/L (21-72)   01/03/18  07:40    


 


Alkaline Phosphatase  40 U/L ()   01/03/18  07:40    


 


Total Creatine Kinase  207 U/L ()  H  01/02/18  01:56    


 


CK-MB (Mass)  6.46 ng/mL (0.0-3.38)  H  01/02/18  01:56    


 


Troponin I  0.9470 ng/mL (0.00-0.120)  H*  01/02/18  01:56    


 


NT-Pro-B Natriuret Pep  317 pg/mL (0-900)   12/31/17  23:27    


 


Total Protein  6.7 g/dL (6.3-8.3)   01/03/18  07:40    


 


Albumin  3.6 g/dL (3.5-5.0)   01/03/18  07:40    


 


Globulin  3.2 gm/dL (2.2-3.9)   01/03/18  07:40    


 


Albumin/Globulin Ratio  1.1  (1.0-2.1)   01/03/18  07:40    


 


Procalcitonin  0.82 NG/ML (0.19-0.49)  H  01/01/18  19:33    


 


Urine Color  Straw  (YELLOW)   12/31/17  23:46    


 


Urine Clarity  Clear  (Clear)   12/31/17  23:46    


 


Urine pH  5.0  (5.0-8.0)   12/31/17  23:46    


 


Ur Specific Gravity  1.010  (1.003-1.030)   12/31/17  23:46    


 


Urine Protein  Negative mg/dL (NEGATIVE)   12/31/17  23:46    


 


Urine Glucose (UA)  1+ mg/dL (Normal)  H  12/31/17  23:46    


 


Urine Ketones  Negative mg/dL (NEGATIVE)   12/31/17  23:46    


 


Urine Blood  Negative  (NEGATIVE)   12/31/17  23:46    


 


Urine Nitrate  Negative  (NEGATIVE)   12/31/17  23:46    


 


Urine Bilirubin  Negative  (NEGATIVE)   12/31/17  23:46    


 


Urine Urobilinogen  Normal mg/dL (0.2-1.0)   12/31/17  23:46    


 


Ur Leukocyte Esterase  Neg Anthony/uL (Negative)   12/31/17  23:46    


 


Urine WBC (Auto)  < 1 /hpf (0-5)   12/31/17  23:46    


 


Urine RBC (Auto)  1 /hpf (0-3)   12/31/17  23:46    


 


Ur Squamous Epith Cells  < 1 /hpf (0-5)   12/31/17  23:46    


 


Influenza Typ A,B (EIA)  Negative for flu a/b  (NEGATIVE)   01/01/18  Unknown














- Hospital Course


Hospital Course: 


"CC: Shortness of breath





This is a 77 year old male with PMHx COPD, asthma, BPH, HTN, HLD who presented 

to the ED in respiratory distress. Patient is unable to communicate with me at 

this time due to his dyspnea. Per ED documentation, patient was short of breath 

for 8 hours prior to arrival. He was placed on BiPAP upon arrival to the ED."





Patient admitted for COPD exacerbation and treated with BiPAP, Solumedrol, 

Duonebs, and Singulair. Patient put on antibiotics for possible infectious 

cause. Patient to go home on Avelox for four days. 


Patient with NSTEMI and elevated troponins. Patient started on Heparin drip. As 

per Dr. Chowdhury cardiac cath to be done as an outpatient. Patient to go home 

on Plavix 75mg daily and follow up with Dr. Chowdhury within a few days. 


Patient with history of hypertension continued on his home medications: 

Lisinopril 10 po daily and HCTZ 12.5 mg PO daily.


Patient with a history of HLD continued on his home med Crestor 10 mg PO HS.


Patient with history of BPH continued on home med Finasteride 5 mg PO daily.


Patient given Heparin 5000U SC Q8 and Protonix 40 mg PO daily for DVT 

prophylaxis. 


Patient to continue home medications. 





Patient stable for discharge as per Dr. Palomino and Dr. Chowdhury. Patient to 

return to ED if chest pain returns. 


Patient explained instructions who understands and agrees. 


This is a summary of the hospital course, please see chart for full details. 











Discharge Exam





- Head Exam


Head Exam: NORMAL INSPECTION





- Eye Exam


Eye Exam: EOMI, Normal appearance





- ENT Exam


ENT Exam: Mucous Membranes Moist





- Respiratory Exam


Respiratory Exam: Clear to PA & Lateral, NORMAL BREATHING PATTERN, UNREMARKABLE





- Cardiovascular Exam


Cardiovascular Exam: REGULAR RHYTHM, +S1, +S2





- GI/Abdominal Exam


GI & Abdominal Exam: Normal Bowel Sounds.  absent: Distended, Firm





- Extremities Exam


Extremities exam: normal inspection





- Neurological Exam


Neurological exam: Alert, Oriented x3





- Psychiatric Exam


Psychiatric exam: Normal Affect, Normal Mood





- Skin


Skin Exam: Intact, Normal Color, Warm





Discharge Plan





- Discharge Medications


Prescriptions: 


Clopidogrel [Plavix] 75 mg PO DAILY #30 tab


methylPREDNISolone [Solu-Medrol] 40 mg IVP DAILY #5 ml


Moxifloxacin [Avelox] 400 mg PO DAILY #4 tab





- Follow Up Plan


Condition: FAIR


Disposition: HOME/ ROUTINE


Additional Instructions: 


Patient stable for discharge as per Dr. Chowdhury and Dr. Palomino. Patient to 

continue taking home medications. Patient to also take Avelox 400mg daily for 

four days, Prednisone 40mg daily for 5 days, and Plavix 75mg daily for 30 days. 

Patient to make an appointment with Dr. Chowdhury (cardiology) within a few 

days. Patient to follow up with primary medical doctor within one week. Patient 

to return to the Emergency Room immediately if chest pain returns. 


Referrals: 


Shi Chowdhury MD [Staff Provider] -

## 2018-01-03 NOTE — CP.PCM.PN
Subjective





- Date & Time of Evaluation


Date of Evaluation: 01/03/18


Time of Evaluation: 12:00





- Subjective


Subjective: 





Pt seen and evaluated at bedside. Feeling much better. Denies shortness of, 

denies chest pain, palpitations, nausea, vomiting, fever, chills. Pt afebrile, 

sitting in bed comfortably on room air.





Objective





- Vital Signs/Intake and Output


Vital Signs (last 24 hours): 


 











Temp Pulse Resp BP Pulse Ox


 


 98.4 F   61   20   110/74   98 


 


 01/03/18 16:14  01/03/18 16:14  01/03/18 16:14  01/03/18 16:14  01/03/18 16:14








Intake and Output: 


 











 01/03/18 01/03/18





 06:59 18:59


 


Intake Total  330


 


Balance  330














- Labs


Labs: 


 





 01/03/18 07:40 





 01/03/18 07:40 





 











PT  11.5 SECONDS (9.7-12.2)   01/02/18  01:57    


 


INR  1.0   01/02/18  01:57    


 


APTT  47 SECONDS (21-34)  H  01/02/18  11:56    














- Head Exam


Head Exam: ATRAUMATIC, NORMOCEPHALIC





- ENT Exam


ENT Exam: Mucous Membranes Moist





- Neck Exam


Neck Exam: Normal Inspection





- Respiratory Exam


Respiratory Exam: Decreased Breath Sounds





- Cardiovascular Exam


Cardiovascular Exam: REGULAR RHYTHM





Assessment and Plan


(1) COPD (chronic obstructive pulmonary disease)


Assessment & Plan: 


discharge patient home on by mouth prednisone


Rescue inhalers





Status: Acute

## 2018-01-04 NOTE — CARD
--------------- APPROVED REPORT --------------





EKG Measurement

Heart [id number]

KS [id number]

[id number]

[id number]

[id number]



<Conclusion>

Normal sinus rhythm

Prolonged QT

Abnormal ECG

## 2018-02-06 ENCOUNTER — HOSPITAL ENCOUNTER (INPATIENT)
Dept: HOSPITAL 31 - C.ER | Age: 78
LOS: 2 days | Discharge: HOME | DRG: 192 | End: 2018-02-08
Attending: INTERNAL MEDICINE | Admitting: INTERNAL MEDICINE
Payer: MEDICARE

## 2018-02-06 VITALS — RESPIRATION RATE: 20 BRPM

## 2018-02-06 VITALS — BODY MASS INDEX: 20.3 KG/M2

## 2018-02-06 DIAGNOSIS — N40.0: ICD-10-CM

## 2018-02-06 DIAGNOSIS — E55.9: ICD-10-CM

## 2018-02-06 DIAGNOSIS — I10: ICD-10-CM

## 2018-02-06 DIAGNOSIS — J44.1: Primary | ICD-10-CM

## 2018-02-06 DIAGNOSIS — I25.2: ICD-10-CM

## 2018-02-06 DIAGNOSIS — E78.5: ICD-10-CM

## 2018-02-06 LAB
ALBUMIN SERPL-MCNC: 4.1 G/DL (ref 3.5–5)
ALBUMIN/GLOB SERPL: 1.3 {RATIO} (ref 1–2.1)
ALT SERPL-CCNC: 18 U/L (ref 21–72)
ARTERIAL BLOOD GAS HEMOGLOBIN: 12.3 G/DL (ref 11.7–17.4)
ARTERIAL BLOOD GAS O2 SAT: 97 % (ref 95–98)
ARTERIAL BLOOD GAS PCO2: 38 MM/HG (ref 35–45)
ARTERIAL BLOOD GAS TCO2: 26.4 MMOL/L (ref 22–28)
ARTERIAL PATENCY WRIST A: (no result)
AST SERPL-CCNC: 20 U/L (ref 17–59)
BASOPHILS # BLD AUTO: 0.1 K/UL (ref 0–0.2)
BASOPHILS NFR BLD: 2.1 % (ref 0–2)
BNP SERPL-MCNC: 51.1 PG/ML (ref 0–900)
BUN SERPL-MCNC: 24 MG/DL (ref 9–20)
CALCIUM SERPL-MCNC: 9.4 MG/DL (ref 8.6–10.4)
EOSINOPHIL # BLD AUTO: 1.5 K/UL (ref 0–0.7)
EOSINOPHIL NFR BLD: 25 % (ref 0–4)
EOSINOPHIL NFR BLD: 26 % (ref 0–4)
ERYTHROCYTE [DISTWIDTH] IN BLOOD BY AUTOMATED COUNT: 14.2 % (ref 11.5–14.5)
GFR NON-AFRICAN AMERICAN: > 60
HCO3 BLDA-SCNC: 25.6 MMOL/L (ref 21–28)
HGB BLD-MCNC: 13 G/DL (ref 12–18)
INHALED O2 CONCENTRATION: 21 %
LYMPHOCYTE: 31 % (ref 20–40)
LYMPHOCYTES # BLD AUTO: 1.8 K/UL (ref 1–4.3)
LYMPHOCYTES NFR BLD AUTO: 29.1 % (ref 20–40)
MCH RBC QN AUTO: 30.1 PG (ref 27–31)
MCHC RBC AUTO-ENTMCNC: 33.8 G/DL (ref 33–37)
MCV RBC AUTO: 89.1 FL (ref 80–94)
MONOCYTE: 3 % (ref 0–10)
MONOCYTES # BLD: 0.5 K/UL (ref 0–0.8)
MONOCYTES NFR BLD: 7.7 % (ref 0–10)
NEUTROPHILS # BLD: 2.2 K/UL (ref 1.8–7)
NEUTROPHILS NFR BLD AUTO: 36.1 % (ref 50–75)
NEUTROPHILS NFR BLD AUTO: 40 % (ref 50–75)
NRBC BLD AUTO-RTO: 0.1 % (ref 0–2)
PH BLDA: 7.43 [PH] (ref 7.35–7.45)
PLATELET # BLD EST: NORMAL 10*3/UL
PLATELET # BLD: 208 K/UL (ref 130–400)
PMV BLD AUTO: 9.3 FL (ref 7.2–11.7)
PO2 BLDA: 69 MM/HG (ref 80–100)
RBC # BLD AUTO: 4.31 MIL/UL (ref 4.4–5.9)
RBC MORPH BLD: NORMAL
TOTAL CELLS COUNTED BLD: 100
WBC # BLD AUTO: 6 K/UL (ref 4.8–10.8)

## 2018-02-06 NOTE — C.PDOC
History Of Present Illness


Hx obtained via  #[id number]





77-year-old male, PMHx includes Asthma, and COPD, presents to the emergency 

department with complaints of five day duration of shortness of breath. Patient 

states he is using his at-home treatments with minimal relief. Patient denies 

nausea/vomiting, fevers or chills. No other complaints at this time.


Chief Complaint (Nursing): Shortness Of Breath


History Per: Patient


History/Exam Limitations: no limitations


Onset/Duration Of Symptoms: Days


Current Symptoms Are (Timing): Still Present





Past Medical History


Reviewed: Historical Data, Nursing Documentation


Vital Signs: 


 Last Vital Signs











Temp  97.2 F L  18 13:40


 


Pulse  62   18 13:40


 


Resp  20   18 13:40


 


BP  108/74   18 13:40


 


Pulse Ox  98   18 14:17














- Medical History


PMH: Anxiety, Asthma, Benign Prostatic Hyperplasia, COPD, Depression, Gastritis

, HTN, Hypercholesterolemia


Surgical History: Appendectomy





- [facility] Procedures








INJECT/INFUSE NEC (14)


SUPPLEMENT ABDOMINAL WALL WITH SYNTH SUB, PERC ENDO APPROACH (10/29/15)


VACCINATION NEC (13)








Family History: States: Unknown Family Hx





- Social History


Hx Tobacco Use: No


Hx Alcohol Use: No


Hx Substance Use: No





- Immunization History


Hx Tetanus Toxoid Vaccination: Yes


Hx Influenza Vaccination: Yes ()


Hx Pneumococcal Vaccination: No





Review Of Systems


Except As Marked, All Systems Reviewed And Found Negative.


Constitutional: Negative for: Fever, Chills


Cardiovascular: Negative for: Chest Pain


Respiratory: Positive for: Shortness of Breath, Wheezing


Gastrointestinal: Negative for: Nausea, Vomiting


Musculoskeletal: Negative for: Back Pain


Neurological: Negative for: Weakness, Numbness, Headache, Dizziness





Physical Exam





- Physical Exam


Appears: Non-toxic, No Acute Distress, Other (Speaking in full sentences)


Skin: Warm, Dry, No Rash


Head: Atraumatic, Normacephalic


Eye(s): bilateral: Normal Inspection, PERRL


Nose: Normal


Oral Mucosa: Moist


Lips: Normal Appearing


Neck: Normal ROM


Cardiovascular: Rhythm Regular, No Murmur


Respiratory: No Accessory Muscle Use, Wheezing (B/L expiratory), Other (

Retractions)


Extremity: Normal ROM


Neurological/Psych: Oriented x3, Normal Speech





ED Course And Treatment





- Laboratory Results


Result Diagrams: 


 18 12:18





 18 12:18


ECG: Interpreted By Me, Viewed By Me


ECG Rhythm: Sinus Bradycardia


ECG Interpretation: No Acute Changes


Interpretation Of ECG: Non-specific intraventricular conduction delay.


Rate From EC


O2 Sat by Pulse Oximetry: 98 (RA)


Pulse Ox Interpretation: Normal





- Radiology


CXR: Interpreted by Me, Viewed By Me


CXR Interpretation: Yes: COPD (changes, otherwise negative).  No: Infiltrates, 

Cardiomegaly





Medical Decision Making


Medical Decision Making: 


Plan:


* EKG


* Labs


* Chest X-Ray


* Duoneb


* Reassess and Disposition





On re-evaluation, patient continues to have B/L wheezing, he will be admitted 

for COPD exacerbation. Patient is agreeable with plan and all questions 

answered.





Disposition





- Disposition


Disposition Time: 14:22


Condition: FAIR





- Clinical Impression


Clinical Impression: 


 COPD (chronic obstructive pulmonary disease)








- Scribe Statement


The provider has reviewed the documentation as recorded by the Scribe (Robert Hollis)


All medical record entries made by the Scribe were at my direction and 

personally dictated by me. I have reviewed the chart and agree that the record 

accurately reflects my personal performance of the history, physical exam, 

medical decision making, and the department course for this patient. I have 

also personally directed, reviewed, and agree with the discharge instructions 

and disposition.

## 2018-02-06 NOTE — RAD
Chest x-ray two views 



History: Shortness of breath. 



Comparison: 12/12/2017 



Findings: 



Biapical pleural thickening with upper lobe granulomatous changes. 



Hyperinflation suggestive for COPD and or emphysematous changes. 



Tortuous aorta. 



Cardiomegaly. 



Degenerative changes in the spine and shoulders. Scoliotic curvature 

of the spine. Loss of height of several mid thoracic vertebral 

bodies. 



Impression: 



Biapical pleural thickening with upper lobe granulomatous changes. 



Hyperinflation suggestive for COPD and or emphysematous changes. 



Tortuous aorta. 



Cardiomegaly.

## 2018-02-06 NOTE — CP.PCM.PN
Subjective





- Date & Time of Evaluation


Date of Evaluation: 18


Time of Evaluation: 16:00





- Subjective


Subjective: 





Medicine  Note for Dr. Ashton





77 year old male with past medical history significant for COPD, Asthma, BPH, 

HTN and Hyperlipidemia presents with complaints  of five day duration of 

shortness of breath as per the ED note. Patient speaks in short sentences in a 

low voice, such that it is difficult to understand what he is saying. Per ED 

note, patient was using in-home medications but did not have relief of 

symptoms. 





History obtained from prior admissions:





PMHx: as noted above


PSHx: Appendectomy, Prostate Surgery


Social: Denies current or past use of cigarettes, drugs, and alcohol. Worked 

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 

harmful toxins that precipitated his asthma/COPD. 


Family History: Father,  at 74,  of "stomach pain and natural causes

", Mother,  at 74


Allergies: Seasonal NKDA





Objective





- Vital Signs/Intake and Output


Vital Signs (last 24 hours): 


 











Temp Pulse Resp BP Pulse Ox


 


 97.1 F L  66   20   135/62   95 


 


 18 16:41  18 16:41  18 16:41  18 16:41  18 16:41











- Medications


Medications: 


 Current Medications





Aspirin (Ecotrin)  [facility]ILY [facility]


   Last Admin: 18 17:09 Dose:  81 mg


Clopidogrel Bisulfate (Plavix)  75 mg PO DAILY [facility]


Enoxaparin Sodium (Lovenox)  40 mg SC DAILY [facility]


Ergocalciferol (Drisdol 50,000 Intl Units Cap)  50,000 cap PO QWK [facility]


Finasteride (Proscar)  5 mg PO DAILY [facility]


Methylprednisolone (Solu-Medrol)  40 mg IVP DAILY [facility]


Montelukast Sodium (Singulair)  10 mg PO DAILY [facility]


Pantoprazole Sodium (Protonix Ec Tab)  40 mg PO DAILY [facility]


Rosuvastatin Calcium (Crestor)  10 mg PO HS [facility]


Fluticasone/Salmeterol (Advair Diskus 250/50)  1 puff IH RQD [facility]


Tamsulosin HCl (Flomax)  0.4 mg PO DAILY [facility]











- Labs


Labs: 


 





 18 12:18 





 18 12:18 











- Constitutional


Appears: Non-toxic, No Acute Distress





- Head Exam


Head Exam: ATRAUMATIC, NORMAL INSPECTION, NORMOCEPHALIC





- Eye Exam


Eye Exam: EOMI, Normal appearance





- ENT Exam


ENT Exam: Mucous Membranes Moist





- Neck Exam


Neck Exam: Full ROM





- Respiratory Exam


Respiratory Exam: NORMAL BREATHING PATTERN.  absent: Wheezes


Additional comments: 





poor inspiratory effort





- Cardiovascular Exam


Cardiovascular Exam: +S1, +S2





- GI/Abdominal Exam


GI & Abdominal Exam: Soft, Normal Bowel Sounds





- Back Exam


Back Exam: Full ROM





- Neurological Exam


Neurological Exam: Awake





- Psychiatric Exam


Psychiatric exam: Flat Affect





- Skin


Skin Exam: Dry, Warm


Additional comments: 





hyperpigmented patches on the back





Assessment and Plan





- Assessment and Plan (Free Text)


Assessment: 





COPD Exacerbation


Patient received Duonebs and Solu-medrol in the ED


Start Solumedrol  40 IV daily, Advair daily, Singulair 10 mg PO HS


Cont Duoneb Q6H PRN


Stable ABG


CXR: Biapical pleural thickening with upper lobe granulomatous changes. 

Hyperinflation suggestive for COPD or emphysematous changes; tortuous aorta. 

Refer to complete report.


F/U Influenza studies


F/U AM labs


Avoid beta blockers at this time


Patient will benefit from spirometry testing to assess global initiative for 

chronic obstructive lung disease (GOLD) criteria





History of NSTEMI


On Plavix, ASA


Avoid beta blockers right now due to COPD exacerbation





History of HTN


Heart healthy diet


Continue Lisinopril 10 po daily


Home med HCTZ 12.5 mg PO daily





History of HLD


Cont Crestor 10 mg PO HS





History of BPH


Cont med Finasteride 5 mg PO daily





Vitamin D Deficiency


Vitamin D once a week





Prophylactic Measure


Lovenox 40 SC


Protonix 40 mg PO daily





Discussed with attending. All management and planning per Dr. Ashton.

## 2018-02-07 LAB
ALBUMIN SERPL-MCNC: 3.9 G/DL (ref 3.5–5)
ALBUMIN/GLOB SERPL: 1.3 {RATIO} (ref 1–2.1)
ALT SERPL-CCNC: 15 U/L (ref 21–72)
AST SERPL-CCNC: 16 U/L (ref 17–59)
BASOPHILS # BLD AUTO: 0 K/UL (ref 0–0.2)
BASOPHILS NFR BLD: 0.2 % (ref 0–2)
BUN SERPL-MCNC: 22 MG/DL (ref 9–20)
CALCIUM SERPL-MCNC: 9.4 MG/DL (ref 8.6–10.4)
EOSINOPHIL # BLD AUTO: 0 K/UL (ref 0–0.7)
EOSINOPHIL NFR BLD: 0.1 % (ref 0–4)
ERYTHROCYTE [DISTWIDTH] IN BLOOD BY AUTOMATED COUNT: 14 % (ref 11.5–14.5)
GFR NON-AFRICAN AMERICAN: > 60
HGB BLD-MCNC: 12.9 G/DL (ref 12–18)
LYMPHOCYTES # BLD AUTO: 1.6 K/UL (ref 1–4.3)
LYMPHOCYTES NFR BLD AUTO: 19.7 % (ref 20–40)
MAGNESIUM SERPL-MCNC: 2.2 MG/DL (ref 1.6–2.3)
MCH RBC QN AUTO: 30.7 PG (ref 27–31)
MCHC RBC AUTO-ENTMCNC: 34.5 G/DL (ref 33–37)
MCV RBC AUTO: 89 FL (ref 80–94)
MONOCYTES # BLD: 0.7 K/UL (ref 0–0.8)
MONOCYTES NFR BLD: 9.1 % (ref 0–10)
NEUTROPHILS # BLD: 5.9 K/UL (ref 1.8–7)
NEUTROPHILS NFR BLD AUTO: 70.9 % (ref 50–75)
NRBC BLD AUTO-RTO: 0 % (ref 0–2)
PLATELET # BLD: 206 K/UL (ref 130–400)
PMV BLD AUTO: 9.5 FL (ref 7.2–11.7)
RBC # BLD AUTO: 4.2 MIL/UL (ref 4.4–5.9)
WBC # BLD AUTO: 8.3 K/UL (ref 4.8–10.8)

## 2018-02-07 RX ADMIN — METHYLPREDNISOLONE SODIUM SUCCINATE SCH MG: 40 INJECTION, POWDER, FOR SOLUTION INTRAMUSCULAR; INTRAVENOUS at 17:38

## 2018-02-07 RX ADMIN — ENOXAPARIN SODIUM SCH MG: 40 INJECTION SUBCUTANEOUS at 09:55

## 2018-02-07 RX ADMIN — PANTOPRAZOLE SODIUM SCH MG: 40 TABLET, DELAYED RELEASE ORAL at 09:56

## 2018-02-07 RX ADMIN — FLUTICASONE PROPIONATE AND SALMETEROL SCH PUFF: 50; 250 POWDER RESPIRATORY (INHALATION) at 10:02

## 2018-02-07 NOTE — CP.PCM.PN
Subjective





- Date & Time of Evaluation


Date of Evaluation: 02/07/18


Time of Evaluation: 08:00





- Subjective


Subjective: 





PGY2 medicine progress note for Dr. Ashton:








Patient was seen and examined at bedside today. He reports his breathing has 

improved but patient is still wheezing. He admits to a productive cough as well 

but otherwise feels well. Denies fever/chills. No other complaints at this 

time. Patient had BM, is tolerating full diet and is ambulating well.





Objective





- Vital Signs/Intake and Output


Vital Signs (last 24 hours): 


 











Temp Pulse Resp BP Pulse Ox


 


 97.8 F   55 L  20   119/61   95 


 


 02/07/18 07:30  02/07/18 07:30  02/07/18 07:30  02/07/18 07:30  02/07/18 07:30











- Medications


Medications: 


 Current Medications





Albuterol/Ipratropium (Duoneb 3 Mg/0.5 Mg (3 Ml) Ud)  3 ml INH RQ6 PRN


   PRN Reason: Wheezing


Aspirin (Ecotrin)  81 mg PO DAILY [facility]


   Last Admin: 02/07/18 09:56 Dose:  81 mg


Clopidogrel Bisulfate (Plavix)  75 mg PO DAILY [facility]


   Last Admin: 02/07/18 09:56 Dose:  75 mg


Enoxaparin Sodium (Lovenox)  40 mg SC DAILY [facility]


   Last Admin: 02/07/18 09:55 Dose:  40 mg


Ergocalciferol (Drisdol 50,000 Intl Units Cap)  50,000 cap PO QWK [facility]


Finasteride (Proscar)  5 mg PO DAILY [facility]


   Last Admin: 02/07/18 09:56 Dose:  5 mg


Lisinopril (Zestril)  10 mg PO DAILY [facility]


   Last Admin: 02/07/18 09:56 Dose:  10 mg


Methylprednisolone (Solu-Medrol)  40 mg IVP BID [facility]


Montelukast Sodium (Singulair)  10 mg PO DAILY [facility]


   Last Admin: 02/07/18 09:57 Dose:  10 mg


Pantoprazole Sodium (Protonix Ec Tab)  40 mg PO DAILY [facility]


   Last Admin: 02/07/18 09:56 Dose:  40 mg


Rosuvastatin Calcium (Crestor)  10 mg PO HS [facility]


   Last Admin: 02/06/18 21:15 Dose:  10 mg


Fluticasone/Salmeterol (Advair Diskus 250/50)  1 puff IH RQD [facility]


   Last Admin: 02/07/18 10:02 Dose:  1 puff


Tamsulosin HCl (Flomax)  0.4 mg PO DAILY [facility]


   Last Admin: 02/07/18 09:56 Dose:  0.4 mg











- Labs


Labs: 


 





 02/07/18 07:18 





 02/07/18 07:18 











- Constitutional


Appears: Non-toxic, No Acute Distress





- Head Exam


Head Exam: NORMAL INSPECTION





- Eye Exam


Eye Exam: EOMI





- ENT Exam


ENT Exam: Mucous Membranes Moist





- Respiratory Exam


Respiratory Exam: Wheezes.  absent: Accessory Muscle Use, Clear to Ausculation 

Bilateral, Respiratory Distress





- Cardiovascular Exam


Cardiovascular Exam: REGULAR RHYTHM, +S1, +S2





- GI/Abdominal Exam


GI & Abdominal Exam: Soft, Normal Bowel Sounds.  absent: Distended, Firm, 

Guarding, Tenderness





- Extremities Exam


Extremities Exam: Normal Inspection.  absent: Calf Tenderness, Pedal Edema





- Back Exam


Back Exam: NORMAL INSPECTION.  absent: CVA tenderness (L), CVA tenderness (R), 

paraspinal tenderness





- Neurological Exam


Neurological Exam: Alert, Awake, CN II-XII Intact, Normal Gait, Oriented x3


Neuro motor strength exam: Left Upper Extremity: 5, Right Upper Extremity: 5, 

Left Lower Extremity: 5, Right Lower Extremity: 5





- Psychiatric Exam


Psychiatric exam: Normal Affect, Normal Mood





- Skin


Skin Exam: Dry, Intact, Normal Color, Warm





Assessment and Plan





- Assessment and Plan (Free Text)


Assessment: 





COPD Exacerbation


Patient received Duonebs and Solu-medrol in the ED


Solumedrol  40 IVP BID


Advair daily 


Singulair 10 mg PO HS


Cont Duoneb Q6H PRN


Stable ABG


CXR: Biapical pleural thickening with upper lobe granulomatous changes. 

Hyperinflation suggestive for COPD or emphysematous changes; tortuous aorta. 

Refer to complete report.


Influenza negative


F/U AM labs


Avoid beta blockers at this time


Patient will benefit from spirometry testing to assess global initiative for 

chronic obstructive lung disease (GOLD) criteria


Afebrile, Chest X ray - no signs of pneumonia


f/u am labs





History of NSTEMI


On Plavix, ASA


Avoid beta blockers right now due to COPD exacerbation





History of HTN


Heart healthy diet


Continue Lisinopril 10 po daily


Home med HCTZ 12.5 mg PO daily





History of HLD


Cont Crestor 10 mg PO HS





History of BPH


Cont med Finasteride 5 mg PO daily





Vitamin D Deficiency


Vitamin D once a week





Prophylactic Measure


Lovenox 40 SC


Protonix 40 mg PO daily





Discussed with attending. All management and planning per Dr. Ashton.

## 2018-02-07 NOTE — CARD
--------------- APPROVED REPORT --------------





EKG Measurement

Heart [id number]

NM [id number]

[id number]

[id number]

[id number]



<Conclusion>

Sinus bradycardia

Nonspecific intraventricular conduction delay

Borderline ECG

## 2018-02-08 VITALS
TEMPERATURE: 97.9 F | SYSTOLIC BLOOD PRESSURE: 112 MMHG | DIASTOLIC BLOOD PRESSURE: 66 MMHG | OXYGEN SATURATION: 95 % | HEART RATE: 58 BPM

## 2018-02-08 LAB
ALBUMIN SERPL-MCNC: 3.7 G/DL (ref 3.5–5)
ALBUMIN/GLOB SERPL: 1.4 {RATIO} (ref 1–2.1)
ALT SERPL-CCNC: 20 U/L (ref 21–72)
AST SERPL-CCNC: 14 U/L (ref 17–59)
BASOPHILS # BLD AUTO: 0 K/UL (ref 0–0.2)
BASOPHILS NFR BLD: 0.4 % (ref 0–2)
BUN SERPL-MCNC: 21 MG/DL (ref 9–20)
CALCIUM SERPL-MCNC: 8.8 MG/DL (ref 8.6–10.4)
EOSINOPHIL # BLD AUTO: 0 K/UL (ref 0–0.7)
EOSINOPHIL NFR BLD: 0 % (ref 0–4)
ERYTHROCYTE [DISTWIDTH] IN BLOOD BY AUTOMATED COUNT: 13.8 % (ref 11.5–14.5)
GFR NON-AFRICAN AMERICAN: > 60
HGB BLD-MCNC: 12.5 G/DL (ref 12–18)
LYMPHOCYTES # BLD AUTO: 1.5 K/UL (ref 1–4.3)
LYMPHOCYTES NFR BLD AUTO: 22.5 % (ref 20–40)
MAGNESIUM SERPL-MCNC: 2.3 MG/DL (ref 1.6–2.3)
MCH RBC QN AUTO: 30.7 PG (ref 27–31)
MCHC RBC AUTO-ENTMCNC: 34.8 G/DL (ref 33–37)
MCV RBC AUTO: 88.2 FL (ref 80–94)
MONOCYTES # BLD: 0.4 K/UL (ref 0–0.8)
MONOCYTES NFR BLD: 6.3 % (ref 0–10)
NEUTROPHILS # BLD: 4.7 K/UL (ref 1.8–7)
NEUTROPHILS NFR BLD AUTO: 70.8 % (ref 50–75)
NRBC BLD AUTO-RTO: 0.1 % (ref 0–2)
PLATELET # BLD: 188 K/UL (ref 130–400)
PMV BLD AUTO: 9.7 FL (ref 7.2–11.7)
RBC # BLD AUTO: 4.06 MIL/UL (ref 4.4–5.9)
WBC # BLD AUTO: 6.6 K/UL (ref 4.8–10.8)

## 2018-02-08 RX ADMIN — FLUTICASONE PROPIONATE AND SALMETEROL SCH PUFF: 50; 250 POWDER RESPIRATORY (INHALATION) at 08:21

## 2018-02-08 RX ADMIN — PANTOPRAZOLE SODIUM SCH MG: 40 TABLET, DELAYED RELEASE ORAL at 10:11

## 2018-02-08 RX ADMIN — METHYLPREDNISOLONE SODIUM SUCCINATE SCH MG: 40 INJECTION, POWDER, FOR SOLUTION INTRAMUSCULAR; INTRAVENOUS at 10:11

## 2018-02-08 RX ADMIN — ENOXAPARIN SODIUM SCH MG: 40 INJECTION SUBCUTANEOUS at 10:11

## 2018-02-08 NOTE — CP.PCM.PN
Subjective





- Date & Time of Evaluation


Date of Evaluation: 02/08/18


Time of Evaluation: 07:00





- Subjective


Subjective: 





PGY2 medicine progress note for Dr. Ashton:








Patient was seen and examined at bedside today. He reports his breathing has 

improved and he denies SOB today. He is requesting to go home. He admits to a 

productive cough as well but otherwise feels well. Denies fever/chills. No 

other complaints at this time. Patient had BM, is tolerating full diet and is 

ambulating well.





Objective





- Vital Signs/Intake and Output


Vital Signs (last 24 hours): 


 











Temp Pulse Resp BP Pulse Ox


 


 97.9 F   58 L  20   112/66   95 


 


 02/08/18 07:37  02/08/18 07:37  02/08/18 07:37  02/08/18 07:37  02/08/18 07:37











- Medications


Medications: 


 Current Medications





Albuterol/Ipratropium (Duoneb 3 Mg/0.5 Mg (3 Ml) Ud)  3 ml INH RQ6 PRN


   PRN Reason: Wheezing


   Last Admin: 02/07/18 21:07 Dose:  3 ml


Aspirin (Ecotrin)  81 mg PO DAILY [facility]


   Last Admin: 02/08/18 10:11 Dose:  81 mg


Clopidogrel Bisulfate (Plavix)  75 mg PO DAILY [facility]


   Last Admin: 02/08/18 10:11 Dose:  75 mg


Enoxaparin Sodium (Lovenox)  40 mg SC DAILY [facility]


   Last Admin: 02/08/18 10:11 Dose:  40 mg


Ergocalciferol (Drisdol 50,000 Intl Units Cap)  50,000 cap PO QWK [facility]


Finasteride (Proscar)  5 mg PO DAILY [facility]


   Last Admin: 02/08/18 10:11 Dose:  5 mg


Lisinopril (Zestril)  10 mg PO DAILY [facility]


   Last Admin: 02/08/18 10:11 Dose:  10 mg


Methylprednisolone (Solu-Medrol)  40 mg IVP BID [facility]


   Last Admin: 02/08/18 10:11 Dose:  40 mg


Montelukast Sodium (Singulair)  10 mg PO DAILY [facility]


   Last Admin: 02/08/18 10:11 Dose:  10 mg


Pantoprazole Sodium (Protonix Ec Tab)  40 mg PO DAILY [facility]


   Last Admin: 02/08/18 10:11 Dose:  40 mg


Rosuvastatin Calcium (Crestor)  10 mg PO HS [facility]


   Last Admin: 02/07/18 21:07 Dose:  10 mg


Fluticasone/Salmeterol (Advair Diskus 250/50)  1 puff IH RQD [facility]


   Last Admin: 02/08/18 08:21 Dose:  1 puff


Tamsulosin HCl (Flomax)  0.4 mg PO DAILY [facility]


   Last Admin: 02/08/18 10:11 Dose:  0.4 mg











- Labs


Labs: 


 





 02/08/18 07:13 





 02/08/18 07:13 











- Constitutional


Appears: Non-toxic, No Acute Distress





- Head Exam


Head Exam: ATRAUMATIC, NORMAL INSPECTION





- Eye Exam


Eye Exam: EOMI, PERRL


Pupil Exam: NORMAL ACCOMODATION





- ENT Exam


ENT Exam: Mucous Membranes Moist





- Respiratory Exam


Respiratory Exam: NORMAL BREATHING PATTERN.  absent: Accessory Muscle Use, 

Clear to Ausculation Bilateral, Rales, Wheezes, Respiratory Distress





- Cardiovascular Exam


Cardiovascular Exam: REGULAR RHYTHM, +S1, +S2





- GI/Abdominal Exam


GI & Abdominal Exam: Soft, Normal Bowel Sounds.  absent: Distended, Guarding, 

Tenderness





- Extremities Exam


Extremities Exam: Normal Inspection.  absent: Calf Tenderness, Pedal Edema





- Back Exam


Back Exam: NORMAL INSPECTION.  absent: CVA tenderness (L), CVA tenderness (R), 

paraspinal tenderness





- Neurological Exam


Neurological Exam: Alert, Awake, CN II-XII Intact, Normal Gait, Oriented x3


Neuro motor strength exam: Left Upper Extremity: 5, Right Upper Extremity: 5, 

Left Lower Extremity: 5, Right Lower Extremity: 5





- Psychiatric Exam


Psychiatric exam: Normal Affect, Normal Mood





- Skin


Skin Exam: Dry, Intact, Normal Color, Warm





Assessment and Plan





- Assessment and Plan (Free Text)


Assessment: 








COPD Exacerbation


Patient received Duonebs and Solu-medrol in the ED


Solumedrol  40 IVP BID


Advair daily 


Singulair 10 mg PO HS


Cont Duoneb Q6H PRN


Stable ABG


CXR: Biapical pleural thickening with upper lobe granulomatous changes. 

Hyperinflation suggestive for COPD or emphysematous changes; tortuous aorta. 

Refer to complete report.


Influenza negative


Avoid beta blockers at this time


Patient will benefit from spirometry testing to assess global initiative for 

chronic obstructive lung disease (GOLD) criteria


Afebrile, Chest X ray - no signs of pneumonia


f/u am labs





History of NSTEMI


On Plavix, ASA


Avoid beta blockers right now due to COPD exacerbation





History of HTN


Heart healthy diet


Continue Lisinopril 10 po daily


Home med HCTZ 12.5 mg PO daily





History of HLD


Cont Crestor 10 mg PO HS





History of BPH


Cont med Finasteride 5 mg PO daily





Vitamin D Deficiency


Vitamin D once a week





Prophylactic Measure


Lovenox 40 SC


Protonix 40 mg PO daily





Discussed with attending. All management and planning per Dr. Ashton.





Patient is stable for discharge home. Patient is to follow up with his primary 

care physician or Dr. Ashton within one week of discharge for post hospital 

care. Patient is to resume all of his home medications. He is also to take a 

Medrol dose pack of steroids. He will need to pick this up at his pharmacy. He 

is to take this as directed on the pack. Patient is to return to the emergency 

room if symptoms return. All instructions explained to the patient and he 

agrees.

## 2018-03-17 ENCOUNTER — HOSPITAL ENCOUNTER (EMERGENCY)
Dept: HOSPITAL 31 - C.ER | Age: 78
Discharge: HOME | End: 2018-03-17
Payer: MEDICARE

## 2018-03-17 VITALS — RESPIRATION RATE: 18 BRPM

## 2018-03-17 VITALS — DIASTOLIC BLOOD PRESSURE: 69 MMHG | SYSTOLIC BLOOD PRESSURE: 123 MMHG | HEART RATE: 72 BPM

## 2018-03-17 VITALS — BODY MASS INDEX: 20.3 KG/M2

## 2018-03-17 VITALS — OXYGEN SATURATION: 94 %

## 2018-03-17 VITALS — TEMPERATURE: 98.3 F

## 2018-03-17 DIAGNOSIS — J44.1: Primary | ICD-10-CM

## 2018-03-17 LAB
ALBUMIN SERPL-MCNC: 4.2 G/DL (ref 3.5–5)
ALBUMIN/GLOB SERPL: 1.2 {RATIO} (ref 1–2.1)
ALT SERPL-CCNC: 21 U/L (ref 21–72)
ANISOCYTOSIS BLD QL SMEAR: SLIGHT
AST SERPL-CCNC: 24 U/L (ref 17–59)
BASOPHILS # BLD AUTO: 0.1 K/UL (ref 0–0.2)
BASOPHILS NFR BLD: 2.6 % (ref 0–2)
BNP SERPL-MCNC: 91 PG/ML (ref 0–900)
BUN SERPL-MCNC: 13 MG/DL (ref 9–20)
CALCIUM SERPL-MCNC: 9.2 MG/DL (ref 8.6–10.4)
CK MB SERPL-MCNC: 1.24 NG/ML (ref 0–3.38)
EOSINOPHIL # BLD AUTO: 1.2 K/UL (ref 0–0.7)
EOSINOPHIL NFR BLD: 22 % (ref 0–4)
EOSINOPHIL NFR BLD: 24.1 % (ref 0–4)
ERYTHROCYTE [DISTWIDTH] IN BLOOD BY AUTOMATED COUNT: 14 % (ref 11.5–14.5)
GFR NON-AFRICAN AMERICAN: > 60
HGB BLD-MCNC: 13.1 G/DL (ref 12–18)
LYMPHOCYTE: 34 % (ref 20–40)
LYMPHOCYTES # BLD AUTO: 1.5 K/UL (ref 1–4.3)
LYMPHOCYTES NFR BLD AUTO: 30.4 % (ref 20–40)
MCH RBC QN AUTO: 30.1 PG (ref 27–31)
MCHC RBC AUTO-ENTMCNC: 34.1 G/DL (ref 33–37)
MCV RBC AUTO: 88.3 FL (ref 80–94)
MONOCYTE: 7 % (ref 0–10)
MONOCYTES # BLD: 0.4 K/UL (ref 0–0.8)
MONOCYTES NFR BLD: 7.2 % (ref 0–10)
NEUTROPHILS # BLD: 1.7 K/UL (ref 1.8–7)
NEUTROPHILS NFR BLD AUTO: 35 % (ref 50–75)
NEUTROPHILS NFR BLD AUTO: 35.7 % (ref 50–75)
NRBC BLD AUTO-RTO: 0.2 % (ref 0–2)
OVALOCYTES BLD QL SMEAR: SLIGHT
PLATELET # BLD EST: NORMAL 10*3/UL
PLATELET # BLD: 186 K/UL (ref 130–400)
PMV BLD AUTO: 9.3 FL (ref 7.2–11.7)
POIKILOCYTOSIS BLD QL SMEAR: SLIGHT
RBC # BLD AUTO: 4.35 MIL/UL (ref 4.4–5.9)
TEARDROP CELLS: SLIGHT
TOTAL CELLS COUNTED BLD: 100
VARIANT LYMPHS NFR BLD MANUAL: 2 % (ref 0–0)
WBC # BLD AUTO: 4.9 K/UL (ref 4.8–10.8)

## 2018-03-17 PROCEDURE — 82550 ASSAY OF CK (CPK): CPT

## 2018-03-17 PROCEDURE — 80053 COMPREHEN METABOLIC PANEL: CPT

## 2018-03-17 PROCEDURE — 85025 COMPLETE CBC W/AUTO DIFF WBC: CPT

## 2018-03-17 PROCEDURE — 94150 VITAL CAPACITY TEST: CPT

## 2018-03-17 PROCEDURE — 99285 EMERGENCY DEPT VISIT HI MDM: CPT

## 2018-03-17 PROCEDURE — 94640 AIRWAY INHALATION TREATMENT: CPT

## 2018-03-17 PROCEDURE — 71045 X-RAY EXAM CHEST 1 VIEW: CPT

## 2018-03-17 PROCEDURE — 82553 CREATINE MB FRACTION: CPT

## 2018-03-17 PROCEDURE — 96374 THER/PROPH/DIAG INJ IV PUSH: CPT

## 2018-03-17 PROCEDURE — 83880 ASSAY OF NATRIURETIC PEPTIDE: CPT

## 2018-03-17 PROCEDURE — 84484 ASSAY OF TROPONIN QUANT: CPT

## 2018-03-17 NOTE — C.PDOC
History Of Present Illness


78 year old male with PMHx of COPD presents to ED for evaluation of shortness 

of breath, wheezing, non-productive cough, and pleuritic chest pain for the 

past several days. Pt states his symptoms feel similar to previous COPD 

exacerbation.  He denies fever, palpitations, nausea/vomiting.  Patient has 

been using albuterol neb treatments.  





Time Seen by Provider: 03/17/18 09:23


Chief Complaint (Nursing): Chest Pain


History Per: Patient


History/Exam Limitations: no limitations


Onset/Duration Of Symptoms: Days


Current Symptoms Are (Timing): Still Present


Quality: "Pain"


Exacerbating Factor(s): Coughing


Current Respiratory Medications: See Home Med List


Associated Symptoms: denies: Fever, Chills, Sweating, Bloody Cough, Heart Racing

, Leg/Calf Pain, Ankle/Leg Swelling, Dizziness, Light-headedness


Additional History Per: Patient





Past Medical History


Reviewed: Historical Data, Nursing Documentation, Vital Signs


Vital Signs: 


 Last Vital Signs











Temp  98.3 F   03/17/18 13:26


 


Pulse  72   03/17/18 12:53


 


Resp  18   03/17/18 13:22


 


BP  123/69   03/17/18 12:53


 


Pulse Ox  94 L  03/17/18 13:28














- Medical History


PMH: Anxiety, Asthma, Benign Prostatic Hyperplasia, COPD, Depression, Gastritis

, HTN, Hypercholesterolemia


   Denies: Atrial Fibrillation, Cardia Arrhythmia, CHF, Chronic Kidney Disease


Surgical History: Appendectomy





- [facility] Procedures








INJECT/INFUSE NEC (03/19/14)


SUPPLEMENT ABDOMINAL WALL WITH SYNTH SUB, PERC ENDO APPROACH (10/29/15)


VACCINATION NEC (06/02/13)








Family History: States: Unknown Family Hx





- Social History


Hx Tobacco Use: No


Hx Alcohol Use: No


Hx Substance Use: No





- Immunization History


Hx Tetanus Toxoid Vaccination: Yes


Hx Influenza Vaccination: Yes (2017)


Hx Pneumococcal Vaccination: No





Review Of Systems


Except As Marked, All Systems Reviewed And Found Negative.


Constitutional: Negative for: Fever, Chills


Cardiovascular: Negative for: Palpitations, Edema, Light Headedness


Respiratory: Positive for: Cough, Shortness of Breath, Pleuritic Pain, 

Wheezing.  Negative for: Hemoptysis, Sputum


Gastrointestinal: Negative for: Nausea, Vomiting, Abdominal Pain


Neurological: Negative for: Headache, Dizziness





Physical Exam





- Physical Exam


Appears: Non-toxic, No Acute Distress


Skin: Normal Color, Warm, Dry


Head: Normacephalic


Eye(s): bilateral: Normal Inspection


Oral Mucosa: Moist


Chest: Symmetrical


Cardiovascular: Rhythm Regular, No Murmur


Respiratory: No Rales, No Rhonchi, Wheezing (expiratory wheezing bilaterally), 

Other (Pt speaking in full sentences)


Gastrointestinal/Abdominal: Soft, No Tenderness


Extremity: Normal ROM, No Pedal Edema


Neurological/Psych: Oriented x3, Normal Speech





ED Course And Treatment





- Laboratory Results


Result Diagrams: 


 03/17/18 10:07





 03/17/18 10:07


O2 Sat by Pulse Oximetry: 94 (RA)


Pulse Ox Interpretation: Normal (COPD history)





- Radiology


CXR: Interpreted by Me, Viewed By Me


CXR Interpretation: Yes: No Acute Disease.  No: Infiltrates


Progress Note: Blood work, CXR ordered and reviewed. Pt was given IV Solu-

Medrol and nebulizer treatments.


Reevaluation Time: 13:25


Reassessment Condition: Improved (Patient reassessed, states he feels 

significantly better.  On exam, he has good air entry B/L and occasional mild 

wheezing, without accessory muslce use.  CXR (-) for infiltrates.  Patient is 

well appearing and wants to be discharged home.  Rxs for prednisone and 

albuterol inhaler given.  Patient instructed to follow up with PMD/clinic in 1-

2 days, and he understands he should return to ED if symptoms worsen.)





Disposition


Counseled Patient/Family Regarding: Studies Performed, Diagnosis, Need For 

Followup, Rx Given





- Disposition


Referrals: 


Jarad Trent MD [Staff Provider] - 


Disposition: HOME/ ROUTINE


Disposition Time: 13:25


Condition: STABLE


Additional Instructions: 


SEGUIMIENTO CON SMITH MDICO EN 1-2 ROSE





USE MEDICAMENTOS SEGN LO INDICADO





REGRESE AL LISET DE EMERGENCIA SI LOS SNTOMAS EMPEORAN








FOLLOW UP WITH YOUR DOCTOR IN 1-2 DAYS





USE MEDICATIONS AS DIRECTED





RETURN TO EMERGENCY ROOM IF SYMPTOMS WORSEN 


Prescriptions: 


Albuterol HFA [Ventolin HFA 90 mcg/actuation (8 g)] 0.09 mg IH Q4 PRN #1 puff


 PRN Reason: Wheezing


predniSONE [predniSONE Tab] 40 mg PO DAILY #8 tab


Instructions:  Exacerbation of COPD (DC)


Forms:  [employer] (English)


Print Language: Citizen of the [place]





- POA


Present On Arrival: None





- Clinical Impression


Clinical Impression: 


 COPD (chronic obstructive pulmonary disease), COPD exacerbation








- Scribe Statement


The provider has reviewed the documentation as recorded by the Scribe


Mike Mello





All medical record entries made by the Richardibyolanda were at my direction and 

personally dictated by me. I have reviewed the chart and agree that the record 

accurately reflects my personal performance of the history, physical exam, 

medical decision making, and the department course for this patient. I have 

also personally directed, reviewed, and agree with the discharge instructions 

and disposition.

## 2018-03-17 NOTE — RAD
PROCEDURE:  CHEST RADIOGRAPH, 1 VIEW



HISTORY:

SOB



COMPARISON:

2/6/2018



FINDINGS:



LUNGS:

Clear.



PLEURA:

No pneumothorax or pleural fluid seen.



CARDIOVASCULAR:

Normal.



OSSEOUS STRUCTURES:

No significant abnormalities.



VISUALIZED UPPER ABDOMEN:

Normal.



OTHER FINDINGS:

None. 



IMPRESSION:

No active disease.

## 2018-06-02 ENCOUNTER — HOSPITAL ENCOUNTER (EMERGENCY)
Dept: HOSPITAL 31 - C.ER | Age: 78
Discharge: HOME | End: 2018-06-02
Payer: MEDICARE

## 2018-06-02 VITALS
SYSTOLIC BLOOD PRESSURE: 112 MMHG | TEMPERATURE: 98.4 F | DIASTOLIC BLOOD PRESSURE: 64 MMHG | RESPIRATION RATE: 18 BRPM | HEART RATE: 56 BPM

## 2018-06-02 VITALS — BODY MASS INDEX: 20.3 KG/M2

## 2018-06-02 VITALS — OXYGEN SATURATION: 95 %

## 2018-06-02 DIAGNOSIS — E78.00: ICD-10-CM

## 2018-06-02 DIAGNOSIS — I10: ICD-10-CM

## 2018-06-02 DIAGNOSIS — R11.2: ICD-10-CM

## 2018-06-02 DIAGNOSIS — K59.00: Primary | ICD-10-CM

## 2018-06-02 DIAGNOSIS — J44.9: ICD-10-CM

## 2018-06-02 LAB
ALBUMIN SERPL-MCNC: 3.6 G/DL (ref 3.5–5)
ALBUMIN/GLOB SERPL: 1.3 {RATIO} (ref 1–2.1)
ALT SERPL-CCNC: 6 U/L (ref 21–72)
APTT BLD: 29 SECONDS (ref 21–34)
AST SERPL-CCNC: 13 U/L (ref 17–59)
BASOPHILS # BLD AUTO: 0.1 K/UL (ref 0–0.2)
BASOPHILS NFR BLD: 0.8 % (ref 0–2)
BILIRUB UR-MCNC: NEGATIVE MG/DL
BUN SERPL-MCNC: 15 MG/DL (ref 9–20)
CALCIUM SERPL-MCNC: 8.9 MG/DL (ref 8.6–10.4)
EOSINOPHIL # BLD AUTO: 0 K/UL (ref 0–0.7)
EOSINOPHIL NFR BLD: 0.3 % (ref 0–4)
ERYTHROCYTE [DISTWIDTH] IN BLOOD BY AUTOMATED COUNT: 14.7 % (ref 11.5–14.5)
GFR NON-AFRICAN AMERICAN: > 60
GLUCOSE UR STRIP-MCNC: NORMAL MG/DL
HGB BLD-MCNC: 13.4 G/DL (ref 12–18)
INR PPP: 1
LEUKOCYTE ESTERASE UR-ACNC: (no result) LEU/UL
LIPASE: 26 U/L (ref 23–300)
LYMPHOCYTES # BLD AUTO: 1.9 K/UL (ref 1–4.3)
LYMPHOCYTES NFR BLD AUTO: 26.7 % (ref 20–40)
MCH RBC QN AUTO: 30.9 PG (ref 27–31)
MCHC RBC AUTO-ENTMCNC: 34.6 G/DL (ref 33–37)
MCV RBC AUTO: 89.4 FL (ref 80–94)
MONOCYTES # BLD: 0.8 K/UL (ref 0–0.8)
MONOCYTES NFR BLD: 10.9 % (ref 0–10)
NEUTROPHILS # BLD: 4.3 K/UL (ref 1.8–7)
NEUTROPHILS NFR BLD AUTO: 61.3 % (ref 50–75)
NRBC BLD AUTO-RTO: 0.1 % (ref 0–2)
PH UR STRIP: 7 [PH] (ref 5–8)
PLATELET # BLD: 190 K/UL (ref 130–400)
PMV BLD AUTO: 8.6 FL (ref 7.2–11.7)
PROT UR STRIP-MCNC: NEGATIVE MG/DL
PROTHROMBIN TIME: 11 SECONDS (ref 9.7–12.2)
RBC # BLD AUTO: 4.33 MIL/UL (ref 4.4–5.9)
RBC # UR STRIP: NEGATIVE /UL
SP GR UR STRIP: 1.01 (ref 1–1.03)
UROBILINOGEN UR-MCNC: NORMAL MG/DL (ref 0.2–1)
WBC # BLD AUTO: 7 K/UL (ref 4.8–10.8)

## 2018-06-02 PROCEDURE — 84484 ASSAY OF TROPONIN QUANT: CPT

## 2018-06-02 PROCEDURE — 99285 EMERGENCY DEPT VISIT HI MDM: CPT

## 2018-06-02 PROCEDURE — 81001 URINALYSIS AUTO W/SCOPE: CPT

## 2018-06-02 PROCEDURE — 96361 HYDRATE IV INFUSION ADD-ON: CPT

## 2018-06-02 PROCEDURE — 85730 THROMBOPLASTIN TIME PARTIAL: CPT

## 2018-06-02 PROCEDURE — 85610 PROTHROMBIN TIME: CPT

## 2018-06-02 PROCEDURE — 80053 COMPREHEN METABOLIC PANEL: CPT

## 2018-06-02 PROCEDURE — 83690 ASSAY OF LIPASE: CPT

## 2018-06-02 PROCEDURE — 96374 THER/PROPH/DIAG INJ IV PUSH: CPT

## 2018-06-02 PROCEDURE — 74022 RADEX COMPL AQT ABD SERIES: CPT

## 2018-06-02 PROCEDURE — 85025 COMPLETE CBC W/AUTO DIFF WBC: CPT

## 2018-06-02 NOTE — C.PDOC
History Of Present Illness


79 y/o male presents to the ED complaining of abdominal discomfort, worsening 

since yesterday. Associated with nausea and vomiting. Patient states that 2 

days ago he got food delivered from a restaurant, which he ate part of and felt 

fine. He then ate the leftovers yesterday and shortly after the meal, symptoms 

began. Denies any diarrhea, fever, chills, chest pain, or SOB. Last BM was 2 

days ago.





Time Seen by Provider: 06/02/18 07:09


Chief Complaint (Nursing): Abdominal Pain


History Per: Patient


History/Exam Limitations: no limitations


Onset/Duration Of Symptoms: Days


Current Symptoms Are (Timing): Still Present


Associated Symptoms: Nausea, Vomiting





Past Medical History


Reviewed: Historical Data, Nursing Documentation, Vital Signs


Vital Signs: 


 Last Vital Signs











Temp  98.4 F   06/02/18 13:42


 


Pulse  56 L  06/02/18 13:42


 


Resp  18   06/02/18 13:42


 


BP  112/64   06/02/18 13:42


 


Pulse Ox  95   06/02/18 13:47














- Medical History


PMH: Anxiety, Asthma, Benign Prostatic Hyperplasia, COPD, Depression, Gastritis

, HTN, Hypercholesterolemia


   Denies: Atrial Fibrillation, Cardia Arrhythmia, CHF, Chronic Kidney Disease


Surgical History: Appendectomy





- [facility] Procedures








INJECT/INFUSE NEC (03/19/14)


SUPPLEMENT ABDOMINAL WALL WITH SYNTH SUB, PERC ENDO APPROACH (10/29/15)


VACCINATION NEC (06/02/13)








Family History: States: Unknown Family Hx





- Social History


Hx Tobacco Use: No


Hx Alcohol Use: No


Hx Substance Use: No





- Immunization History


Hx Tetanus Toxoid Vaccination: Yes


Hx Influenza Vaccination: Yes (2017)


Hx Pneumococcal Vaccination: No





Review Of Systems


Except As Marked, All Systems Reviewed And Found Negative.


Constitutional: Negative for: Fever, Chills


Cardiovascular: Negative for: Chest Pain


Respiratory: Negative for: Shortness of Breath


Gastrointestinal: Positive for: Nausea, Vomiting, Abdominal Pain, Constipation.

  Negative for: Diarrhea





Physical Exam





- Physical Exam


Appears: Non-toxic, No Acute Distress


Skin: Normal Color, Warm, Dry


Head: Atraumatic, Normacephalic


Eye(s): bilateral: Normal Inspection, PERRL, EOMI


Nose: Normal


Oral Mucosa: Moist


Neck: Normal ROM, Supple


Chest: Symmetrical


Cardiovascular: Rhythm Regular, No Murmur


Respiratory: Normal Breath Sounds, No Rales, No Rhonchi, No Wheezing


Gastrointestinal/Abdominal: Soft, No Tenderness, No Distention, No Guarding


Extremity: Bilateral: Atraumatic, Normal Color And Temperature, Normal ROM


Pulses: Left Dorsalis Pedis: Normal, Right Dorsalis Pedis: Normal


Neurological/Psych: Oriented x3, Normal Speech, Normal Cranial Nerves, Other (

No focal deficits)


Gait: Steady





ED Course And Treatment





- Laboratory Results


Result Diagrams: 


 06/02/18 07:41





 06/02/18 07:41


O2 Sat by Pulse Oximetry: 95 (RA)


Pulse Ox Interpretation: Normal





- Other Rad


  ** obstructive series


X-Ray: Read By Radiologist


Interpretation: Accession No. : [id number].  Patient Name / ID : JENNA LOOMIS  / [id number].  Exam Date : 06/02/2018 07:53:13 ( Approved ).  

Study Comment :  Sex / Age : M  / 78Y.  Creator : Antonio Lopes MD.  

Dictator : Antonio Lopes MD.   :  Approver : Antonio Lopes MD.  Approver2 :  Report Date : 06/02/2018 08:30:05.  My Comment :  ************

***********************************************************************.  

Abdomen four views.  History: Abdominal pain.  Vomiting.  Constipation.  

Comparison: None available.  Findings:  Biapical pleural thickening with upper 

lobe granulomatous changes.  Hyperinflation suggestive for COPD and or 

emphysematous changes.  Bibasilar linear increased markings which may represent 

atelectasis.  Diffuse increased interstitial markings.  Few scattered nodular 

densities in both lung fields for example in the right mid and lung zone. 

Tortuous aorta.  Mild cardiomegaly.  Degenerative changes in the spine and 

shoulders.  Scoliotic curvature of the spine.  Moderate fecal retention in the 

colon.  Calcified phleboliths in the pelvis.  Impression:  Moderate fecal 

retention.


Progress Note: Labs and x-ray obstructive series obtained. X-ray shows moderate 

fecal retention. Patient given mag citrate in the ED. No bowel movement 

elicited. Patient given GoLytely resulting in large bowel movement. Patient now 

reports improvement and is hungry, requesting food. Pending PO challenge.  On 

reassessment, patient is afebrile, tolerating PO, and pain free. Abdomen 

remains soft and non-tender.  Patient will be discharged with precsription for 

lactulose. Advised to follow up with PMD in 1-2 days.


Reevaluation Time: 13:10


Reassessment Condition: Improved





Disposition


Counseled Patient/Family Regarding: Studies Performed, Diagnosis, Need For 

Followup, Rx Given





- Disposition


Referrals: 


Jarad Trent MD [Staff Provider] - 


Disposition: HOME/ ROUTINE


Disposition Time: 13:16


Condition: STABLE


Additional Instructions: 


Follow up with your PMD within 1-2 days. Return to ED if feel worse.





Prescriptions: 


Lactulose 30 ml PO DAILY PRN #600 ml


 PRN Reason: Constipation


Instructions:  Constipation, Adult (DC), Nausea and Vomiting, Adult (DC), Fecal 

Impaction (DC)


Forms:  [facility] (English)





- POA


Present On Arrival: None





- Clinical Impression


Clinical Impression: 


 Constipation, Nausea & vomiting








- PA / NP / Resident Statement


MD/DO has reviewed & agrees with the documentation as recorded.





- Scribe Statement


The provider has reviewed the documentation as recorded by the Scribe (Delores Mckenna)





All medical record entries made by the Scribe were at my direction and 

personally dictated by me. I have reviewed the chart and agree that the record 

accurately reflects my personal performance of the history, physical exam, 

medical decision making, and the department course for this patient. I have 

also personally directed, reviewed, and agree with the discharge instructions 

and disposition.

## 2018-06-02 NOTE — RAD
Abdomen four views 



History: Abdominal pain.  Vomiting.  Constipation. 



Comparison: None available. 



Findings: 



Biapical pleural thickening with upper lobe granulomatous changes. 



Hyperinflation suggestive for COPD and or emphysematous changes. 



Bibasilar linear increased markings which may represent atelectasis. 



Diffuse increased interstitial markings. 



Few scattered nodular densities in both lung fields for example in 

the right mid and lung zone. Tortuous aorta. 



Mild cardiomegaly. 



Degenerative changes in the spine and shoulders. 



Scoliotic curvature of the spine. 



Moderate fecal retention in the colon. 



Calcified phleboliths in the pelvis. 



Impression: 



Moderate fecal retention.

## 2018-06-05 NOTE — CARD
--------------- APPROVED REPORT --------------





EKG Measurement

Heart [id number]

[place] 96P50

[id number]

[id number]

[id number]



<Conclusion>

Sinus bradycardia with short [place]

Otherwise normal ECG

## 2018-06-30 ENCOUNTER — HOSPITAL ENCOUNTER (EMERGENCY)
Dept: HOSPITAL 31 - C.ER | Age: 78
Discharge: HOME | End: 2018-06-30
Payer: MEDICARE

## 2018-06-30 VITALS — HEART RATE: 78 BPM | RESPIRATION RATE: 16 BRPM | DIASTOLIC BLOOD PRESSURE: 79 MMHG | SYSTOLIC BLOOD PRESSURE: 110 MMHG

## 2018-06-30 VITALS — BODY MASS INDEX: 20.3 KG/M2

## 2018-06-30 VITALS — OXYGEN SATURATION: 98 % | TEMPERATURE: 98.2 F

## 2018-06-30 DIAGNOSIS — I10: ICD-10-CM

## 2018-06-30 DIAGNOSIS — J06.9: Primary | ICD-10-CM

## 2018-06-30 DIAGNOSIS — E78.00: ICD-10-CM

## 2018-06-30 NOTE — C.PDOC
History Of Present Illness





79 y/o male with hx asthma/copd c/o copious phlegm that he is having a 

difficult time coughing up for the last few days. pt using albuterol neb 2 

times a day at home., denies fever, cp, sob. pt sts phlegm is thick, and he 

stick his finger down his throat to bring it up. no ear, throat pain., no 

rhinorrhea. 


Time Seen by Provider: 06/30/18 13:13


Chief Complaint (Nursing): Cough, Cold, Congestion


History Per: Patient


History/Exam Limitations: no limitations


Onset/Duration Of Symptoms: Days (4)


Current Symptoms Are (Timing): Still Present


Location Of Pain: None


Sick Contacts (Context): None


Associated Symptoms: Cough, Sputum.  denies: Fever, Chills, Sore Throat, Sinus 

Drainage, Nasal Congestion


Ear Symptoms: Bilateral: None





Past Medical History


Reviewed: Historical Data, Nursing Documentation, Vital Signs


Vital Signs: 


 Last Vital Signs











Temp  98.2 F   06/30/18 12:43


 


Pulse  78   06/30/18 14:42


 


Resp  16   06/30/18 14:42


 


BP  110/79   06/30/18 14:42


 


Pulse Ox  98   06/30/18 14:42














- Medical History


PMH: Anxiety, Asthma, Benign Prostatic Hyperplasia, COPD, Depression, Gastritis

, HTN, Hypercholesterolemia


   Denies: Atrial Fibrillation, Cardia Arrhythmia, CHF, Chronic Kidney Disease


Surgical History: Appendectomy





- [facility] Procedures








INJECT/INFUSE NEC (03/19/14)


SUPPLEMENT ABDOMINAL WALL WITH SYNTH SUB, PERC ENDO APPROACH (10/29/15)


VACCINATION NEC (06/02/13)








Family History: States: Unknown Family Hx





- Social History


Hx Tobacco Use: No


Hx Alcohol Use: No


Hx Substance Use: No





- Immunization History


Hx Tetanus Toxoid Vaccination: Yes


Hx Influenza Vaccination: Yes (2017)


Hx Pneumococcal Vaccination: No





Review Of Systems


Constitutional: Negative for: Fever, Chills


ENT: Negative for: Ear Pain, Ear Discharge, Nose Discharge


Cardiovascular: Negative for: Chest Pain


Respiratory: Positive for: Cough, Sputum.  Negative for: Shortness of Breath, 

Pleuritic Pain


Gastrointestinal: Negative for: Vomiting, Abdominal Pain


Skin: Negative for: Rash


Neurological: Negative for: Weakness, Numbness





Physical Exam





- Physical Exam


Appears: Non-toxic, No Acute Distress


Skin: Warm, Dry


Head: Atraumatic, Normacephalic


Eye(s): bilateral: Normal Inspection


Oral Mucosa: Moist


Throat: No Erythema, No Exudate


Neck: Supple


Chest: No Deformity, No Tenderness


Cardiovascular: Rhythm Regular


Respiratory: No Accessory Muscle Use, No Rales, Rhonchi (scattered left base), 

No Stridor, No Wheezing


Gastrointestinal/Abdominal: Soft, No Tenderness


Extremity: No Pedal Edema


Neurological/Psych: Oriented x3, Normal Speech, Normal Cognition





ED Course And Treatment


O2 Sat by Pulse Oximetry: 98


Pulse Ox Interpretation: Normal





- Radiology


CXR: Viewed By Me, Read By Radiologist


CXR Interpretation: Yes: Other (Hyperinflation possibly secondary to underlying 

emphysema and or COPD. Mild bibasilar atelectasis and or scarring changes.)





Medical Decision Making


Medical Decision Making: 





pt with copious phelgm, no wheezing. no fever or chills,. cxr neg for pna, 

feels bertter after saline neb. d/c home with zpak, saline neb and f/u pmd





Disposition


Counseled Patient/Family Regarding: Studies Performed, Diagnosis, Need For 

Followup, Rx Given





- Disposition


Referrals: 


Jarad Trent MD [Staff Provider] - 


Disposition: HOME/ ROUTINE


Disposition Time: 14:32


Condition: IMPROVED


Additional Instructions: 





Por favor tome antibiticos segn lo prescrito. Utilice ordaz nebulizador de 

albuterol habitual dos veces al da, adalgisa de costumbre, y use solucin salina 

en la mquina nebulizadora (unos ml) dos veces al da para eliminar la flema. 

Por favor, yaniv un seguimiento con ordaz mdico el lunes.


Please take antibiotics as prescribed. Please use your usual albuterol 

nebulizer two times a day as usual, and use saline in nebulizer machine (a few 

ml) two times a day as well to hlep bring up phlegm,. Please follow up with 

your doctor on Monday. 





Prescriptions: 


Azithromycin [Z-Almas] 250 mg PO DAILY #6 tab


Instructions:  Upper Respiratory Infection (ED)


Forms:  Gen Discharge Inst Swedish, [facility] (Swedish)


Print Language: Malay





- Clinical Impression


Clinical Impression: 


 Upper respiratory infection

## 2018-06-30 NOTE — RAD
HISTORY:

cough. few scattered rhonchi left base  



COMPARISON:

Comparison chest 03/17/2018.



TECHNIQUE:

Chest PA and lateral



FINDINGS:



LUNGS:

Hyperinflation possibly secondary to underlying emphysema and or 

COPD. Mild bibasilar atelectasis and or scarring changes.



PLEURA:

No significant pleural effusion identified. No pneumothorax apparent.



CARDIOVASCULAR:

.  Cardiomegaly.



OSSEOUS STRUCTURES:

Note again made of deformity of the left theron thorax likely due to a 

scoliosis of the upper and lower thoracic spine.



VISUALIZED UPPER ABDOMEN:

Normal.



OTHER FINDINGS:

None.



IMPRESSION:

Hyperinflation possibly secondary to underlying emphysema and or 

COPD. Mild bibasilar atelectasis and or scarring changes.

## 2018-08-24 ENCOUNTER — HOSPITAL ENCOUNTER (OUTPATIENT)
Dept: HOSPITAL 31 - C.ER | Age: 78
Setting detail: OBSERVATION
LOS: 1 days | Discharge: HOME | End: 2018-08-25
Attending: INTERNAL MEDICINE | Admitting: INTERNAL MEDICINE
Payer: MEDICARE

## 2018-08-24 VITALS — BODY MASS INDEX: 21.6 KG/M2

## 2018-08-24 DIAGNOSIS — K21.9: ICD-10-CM

## 2018-08-24 DIAGNOSIS — J44.1: ICD-10-CM

## 2018-08-24 DIAGNOSIS — Z90.49: ICD-10-CM

## 2018-08-24 DIAGNOSIS — N40.0: ICD-10-CM

## 2018-08-24 DIAGNOSIS — I10: Primary | ICD-10-CM

## 2018-08-24 DIAGNOSIS — E78.5: ICD-10-CM

## 2018-08-24 DIAGNOSIS — E78.00: ICD-10-CM

## 2018-08-24 LAB
ALBUMIN SERPL-MCNC: 4.3 G/DL (ref 3.5–5)
ALBUMIN/GLOB SERPL: 1.4 {RATIO} (ref 1–2.1)
ALT SERPL-CCNC: 15 U/L (ref 21–72)
AST SERPL-CCNC: 18 U/L (ref 17–59)
BASOPHILS # BLD AUTO: 0.1 K/UL (ref 0–0.2)
BASOPHILS NFR BLD: 1.2 % (ref 0–2)
BNP SERPL-MCNC: 52.9 PG/ML (ref 0–900)
BUN SERPL-MCNC: 16 MG/DL (ref 9–20)
CALCIUM SERPL-MCNC: 10.1 MG/DL (ref 8.6–10.4)
EOSINOPHIL # BLD AUTO: 0.1 K/UL (ref 0–0.7)
EOSINOPHIL NFR BLD: 2.1 % (ref 0–4)
ERYTHROCYTE [DISTWIDTH] IN BLOOD BY AUTOMATED COUNT: 14.3 % (ref 11.5–14.5)
GFR NON-AFRICAN AMERICAN: > 60
HGB BLD-MCNC: 14 G/DL (ref 12–18)
LYMPHOCYTES # BLD AUTO: 2.4 K/UL (ref 1–4.3)
LYMPHOCYTES NFR BLD AUTO: 37.1 % (ref 20–40)
MCH RBC QN AUTO: 31.1 PG (ref 27–31)
MCHC RBC AUTO-ENTMCNC: 34.2 G/DL (ref 33–37)
MCV RBC AUTO: 90.8 FL (ref 80–94)
MONOCYTES # BLD: 0.7 K/UL (ref 0–0.8)
MONOCYTES NFR BLD: 10.3 % (ref 0–10)
NEUTROPHILS # BLD: 3.2 K/UL (ref 1.8–7)
NEUTROPHILS NFR BLD AUTO: 49.3 % (ref 50–75)
NRBC BLD AUTO-RTO: 0 % (ref 0–2)
PLATELET # BLD: 226 K/UL (ref 130–400)
PMV BLD AUTO: 8.4 FL (ref 7.2–11.7)
RBC # BLD AUTO: 4.5 MIL/UL (ref 4.4–5.9)
WBC # BLD AUTO: 6.5 K/UL (ref 4.8–10.8)

## 2018-08-24 PROCEDURE — 36415 COLL VENOUS BLD VENIPUNCTURE: CPT

## 2018-08-24 PROCEDURE — 96376 TX/PRO/DX INJ SAME DRUG ADON: CPT

## 2018-08-24 PROCEDURE — 99285 EMERGENCY DEPT VISIT HI MDM: CPT

## 2018-08-24 PROCEDURE — 93005 ELECTROCARDIOGRAM TRACING: CPT

## 2018-08-24 PROCEDURE — 84484 ASSAY OF TROPONIN QUANT: CPT

## 2018-08-24 PROCEDURE — 82948 REAGENT STRIP/BLOOD GLUCOSE: CPT

## 2018-08-24 PROCEDURE — 96374 THER/PROPH/DIAG INJ IV PUSH: CPT

## 2018-08-24 PROCEDURE — 71046 X-RAY EXAM CHEST 2 VIEWS: CPT

## 2018-08-24 PROCEDURE — 85025 COMPLETE CBC W/AUTO DIFF WBC: CPT

## 2018-08-24 PROCEDURE — 94640 AIRWAY INHALATION TREATMENT: CPT

## 2018-08-24 PROCEDURE — 94760 N-INVAS EAR/PLS OXIMETRY 1: CPT

## 2018-08-24 PROCEDURE — 80053 COMPREHEN METABOLIC PANEL: CPT

## 2018-08-24 PROCEDURE — 83880 ASSAY OF NATRIURETIC PEPTIDE: CPT

## 2018-08-24 PROCEDURE — 96372 THER/PROPH/DIAG INJ SC/IM: CPT

## 2018-08-24 RX ADMIN — FLUTICASONE PROPIONATE AND SALMETEROL SCH: 50; 250 POWDER RESPIRATORY (INHALATION) at 20:04

## 2018-08-24 RX ADMIN — IPRATROPIUM BROMIDE AND ALBUTEROL SULFATE PRN ML: .5; 3 SOLUTION RESPIRATORY (INHALATION) at 20:40

## 2018-08-24 RX ADMIN — POTASSIUM CHLORIDE SCH MEQ: 20 TABLET, EXTENDED RELEASE ORAL at 14:39

## 2018-08-24 RX ADMIN — PANTOPRAZOLE SODIUM SCH: 40 TABLET, DELAYED RELEASE ORAL at 15:05

## 2018-08-24 NOTE — CARD
--------------- APPROVED REPORT --------------





Date of service: 08/24/2018



EKG Measurement

Heart [id number]

ND 98P67

[id number]

[id number]

[id number]



<Conclusion>

Sinus rhythm with short [place]

Otherwise normal ECG

## 2018-08-24 NOTE — C.PDOC
History Of Present Illness


79 y/o male with history of COPD presents to ED with c/o sob, chest discomfort 

and congestion for 4 days associated with nausea. Patient denies fever, chills, 

leg swelling or any other complaints at this time. 


Time Seen by Provider: 18 09:32


Chief Complaint (Nursing): Chest Pain


History Per: Patient


History/Exam Limitations: no limitations


Onset/Duration Of Symptoms: Days


Current Symptoms Are (Timing): Still Present


Associated Symptoms: Nausea





Past Medical History


Reviewed: Historical Data, Nursing Documentation, Vital Signs


Vital Signs: 


 Last Vital Signs











Temp  97.7 F   18 09:26


 


Pulse  76   18 09:26


 


Resp  18   18 09:26


 


BP  96/57 L  18 09:26


 


Pulse Ox  96   18 11:13














- Medical History


PMH: Anxiety, Asthma, Benign Prostatic Hyperplasia, COPD, Depression, Gastritis

, HTN, Hypercholesterolemia


Surgical History: Appendectomy





- [facility] Procedures








INJECT/INFUSE NEC (14)


SUPPLEMENT ABDOMINAL WALL WITH SYNTH SUB, PERC ENDO APPROACH (10/29/15)


VACCINATION NEC (13)








Family History: States: No Known Family Hx





- Social History


Hx Tobacco Use: No


Hx Alcohol Use: No


Hx Substance Use: No





- Immunization History


Hx Tetanus Toxoid Vaccination: Yes


Hx Influenza Vaccination: Yes (2017)


Hx Pneumococcal Vaccination: No





Review Of Systems


Except As Marked, All Systems Reviewed And Found Negative.


Cardiovascular: Positive for: Chest Pain


Respiratory: Positive for: Shortness of Breath


Gastrointestinal: Positive for: Nausea





Physical Exam





- Physical Exam


Appears: Non-toxic, No Acute Distress


Skin: Warm, Dry, No Rash


Head: Atraumatic, Normacephalic


Eye(s): bilateral: Normal Inspection


Oral Mucosa: Moist


Neck: Supple


Cardiovascular: Rhythm Regular


Respiratory: Normal Breath Sounds, No Rales, No Rhonchi, No Wheezing


Gastrointestinal/Abdominal: Soft, No Tenderness, No Guarding, No Rebound


Extremity: No Pedal Edema, Capillary Refill (<2 seconds)


Neurological/Psych: Oriented x3, Normal Speech, Normal Cognition





ED Course And Treatment





- Laboratory Results


Result Diagrams: 


 18 09:54





 18 09:54


ECG: Interpreted By Me, Viewed By Me


ECG Rhythm: Sinus Rhythm


Interpretation Of ECG: NSR at 71 with short UT intervals. Normal access. No ST/

T wave changes


Rate From EC (BPM)


O2 Sat by Pulse Oximetry: 96 (RA)





Medical Decision Making


Medical Decision Making: 


Assessment: SOB, Chest pain





Progress:


Patient admitted for Chest pain and COPD exacerbation





Disposition


Discussed With DrShira: Jorge Oakley Jr.


Doctor Will See Patient In The: Hospital


Counseled Patient/Family Regarding: Studies Performed, Diagnosis





- Disposition


Disposition: HOSPITALIZED


Disposition Time: 11:13


Condition: FAIR


Forms:  [facility] (English)





- Clinical Impression


Clinical Impression: 


 Chest pain, COPD (chronic obstructive pulmonary disease)








- Scribe Statement


The provider has reviewed the documentation as recorded by the Scribe


Indio Luna





All medical record entries made by the Richardibe were at my direction and 

personally dictated by me. I have reviewed the chart and agree that the record 

accurately reflects my personal performance of the history, physical exam, 

medical decision making, and the department course for this patient. I have 

also personally directed, reviewed, and agree with the discharge instructions 

and disposition.

## 2018-08-24 NOTE — CP.PCM.HP
History of Present Illness





- History of Present Illness


History of Present Illness: 





CC: Problem in chest





HPI: Patient is a 78 year old male with a PMH significant for COPD, Asthma, BPH

, GERD, HTN and HLD who presents to the ED reporting that over the past 5-6 

days he has had persistent nausea and vomiting after eating and drinking. He 

notes when he feels hungry, he eats his food and then shortly after eating, he 

has onset of throat pain, chest pain, a subjective fever and feeling unwell, 

which is followed by an episode of vomiting and shortness of breath. He ran out 

of his COPD medications at home, which he uses as needed. He presents to the ED 

today because he was concerned about how long his symptoms were persisting. He 

denies diarrhea, constipation, dizziness, palpitations, changes in his vision 

or abdominal pain. History difficult to obtain - patient states he has some 

memory problems and has a neurologist, but is uncertain what he sees the 

neurologist for. 





Review of Systems: 


Pertinent positives: nausea, vomiting, shortness of breath, throat pain, chest 

pain, subjective fevers


Pertinent negative: chest pain, cough, dizziness, palpitations, abdominal pain, 

[place], headaches, changes in vision, constipation, diarrhea





PMH: COPD, Asthma, BPH, GERD, HTN, HLD 


PSH: Appendectomy, Prostate Surgery


Family History (per prior chart): Father,  at 74,  of "stomach pain 

and natural causes", Mother,  at 74 


Allergies: NKDA 


Social History (per prior chart): No smoking, drugs or alcohol use. Worked for 

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 

toxins that precipitated his asthma/COPD. 


Medications: Alfazosin 10 mg, Pantoprazole 40 mg, Rosuvastatin 10 mg, 

Prednisone 10 mg PO, Montelukast 10 mg, Lisinopril/HCTZ 20 mg, Albuterol/

Solumedrol 0.005%, Proair HFA 40 mcg, Advair disckus 250/50


Code Status: Full Code 


PMD: Dr. Chau according to chart.  





Present on Admission





- Present on Admission


Any Indicators Present on Admission: No





Review of Systems





- Constitutional


Constitutional: Chills.  absent: Fatigue, Headache, Weakness





- EENT


Eyes: absent: Blurred Vision, Change in Vision, Floaters, Irritation, Itchy Eyes

, Pain, Spots in Vision, Other Visual Disturbances


Additional comments: 





Throat pain





- Cardiovascular


Cardiovascular: Chest Pain, Dyspnea.  absent: Dyspnea on Exertion, Leg Edema, 

Lightheadedness, Palpitations, Syncope





- Respiratory


Respiratory: Dyspnea.  absent: Cough, Hemoptysis, Wheezing, Pain on Inspiration

, Chest Congestion, Excessive Mucous Production, Pain with Coughing





- Gastrointestinal


Gastrointestinal: Vomiting.  absent: Constipation, Cramping, Diarrhea





- Genitourinary


Genitourinary: Difficulty Urinating, Urinary Frequency, Urinary Hesitance, 

Urinary Urgency.  absent: Flank Pain, Hematuria





- Musculoskeletal


Musculoskeletal: absent: Back Pain, Muscle Cramps, Muscle Weakness, Neck Pain





- Neurological


Neurological: absent: Dizziness, Headaches, Loss of Vision





Past Patient History





- Infectious Disease


Hx of Infectious Diseases: None





- Past Medical History & Family History


Past Medical History?: Yes





- Past Social History


Smoking Status: Never Smoked





- CARDIAC


Hx Hypercholesterolemia: Yes


Hx Hypertension: Yes





- PULMONARY


Hx Asthma: Yes


Hx Chronic Obstructive Pulmonary Disease (COPD): Yes





- NEUROLOGICAL


Hx Neurological Disorder: No





- HEENT


Hx HEENT Problems: Yes


Other/Comment: wear eyeglasses





- RENAL


Hx Chronic Kidney Disease: No





- ENDOCRINE/METABOLIC


Hx Endocrine Disorders: No





- HEMATOLOGICAL/ONCOLOGICAL


Hx Blood Disorders: No





- INTEGUMENTARY


Hx Dermatological Problems: No





- MUSCULOSKELETAL/RHEUMATOLOGICAL


Hx Musculoskeletal Disorders: No


Hx Falls: No





- GASTROINTESTINAL


Hx Gastritis: Yes





- GENITOURINARY/GYNECOLOGICAL


Hx Genitourinary Disorders: Yes


Hx Prostate Problems: Yes





- PSYCHIATRIC


Hx Anxiety: Yes


Hx Depression: Yes


Hx Substance Use: No





- SURGICAL HISTORY


Hx Appendectomy: Yes





- ANESTHESIA


Hx Anesthesia: Yes


Hx Anesthesia Reactions: No


Hx Malignant Hyperthermia: No





Meds


Allergies/Adverse Reactions: 


 Allergies











Allergy/AdvReac Type Severity Reaction Status Date / Time


 


No Known Allergies Allergy   Verified 18 09:21














Physical Exam





- Constitutional


Appears: Well, Non-toxic, No Acute Distress





- Head Exam


Head Exam: ATRAUMATIC, NORMAL INSPECTION





- Eye Exam


Eye Exam: EOMI, Normal appearance, PERRL.  absent: Conjunctival injection, 

Nystagmus, Scleral icterus


Pupil Exam: NORMAL ACCOMODATION





- ENT Exam


ENT Exam: Mucous Membranes Moist, Normal Exam





- Neck Exam


Neck exam: Positive for: Full Rom, Normal Inspection.  Negative for: Tenderness





- Respiratory Exam


Respiratory Exam: Clear to Auscultation Bilateral, NORMAL BREATHING PATTERN.  

absent: Chest Wall Tenderness





- Cardiovascular Exam


Cardiovascular Exam: REGULAR RHYTHM, RRR, +S1, +S2.  absent: JVD





- GI/Abdominal Exam


GI & Abdominal Exam: Normal Bowel Sounds, Soft.  absent: Distended, Guarding, 

Tenderness





- Extremities Exam


Extremities exam: Positive for: full ROM, normal capillary refill, normal 

inspection, pedal pulses present.  Negative for: pedal edema





- Neurological Exam


Neurological exam: Alert, CN II-XII Intact, Normal Gait, Oriented x3





- Psychiatric Exam


Psychiatric exam: Normal Affect, Normal Mood





- Skin


Skin Exam: Intact, Normal Color





Results





- Vital Signs


Recent Vital Signs: 





 Last Vital Signs











Temp  98.2 F   18 12:21


 


Pulse  52 L  18 12:21


 


Resp  20   18 12:21


 


BP  130/70   18 12:21


 


Pulse Ox  98   18 12:21














- Labs


Result Diagrams: 


 18 09:54





 18 09:54


Labs: 





 Laboratory Results - last 24 hr











  18





  09:54 09:54


 


WBC  6.5 


 


RBC  4.50 


 


Hgb  14.0 


 


Hct  40.9 


 


MCV  90.8 


 


MCH  31.1 H 


 


MCHC  34.2 


 


RDW  14.3 


 


Plt Count  226 


 


MPV  8.4 


 


Neut % (Auto)  49.3 L 


 


Lymph % (Auto)  37.1 


 


Mono % (Auto)  10.3 H 


 


Eos % (Auto)  2.1 


 


Baso % (Auto)  1.2 


 


Neut # (Auto)  3.2 


 


Lymph # (Auto)  2.4 


 


Mono # (Auto)  0.7 


 


Eos # (Auto)  0.1 


 


Baso # (Auto)  0.1 


 


Sodium   141


 


Potassium   3.4 L


 


Chloride   101


 


Carbon Dioxide   28


 


Anion Gap   15


 


BUN   16


 


Creatinine   0.8


 


Est GFR ( Amer)   > 60


 


Est GFR (Non-Af Amer)   > 60


 


Random Glucose   111 H


 


Calcium   10.1


 


Total Bilirubin   1.1


 


AST   18


 


ALT   15 L D


 


Alkaline Phosphatase   57


 


Troponin I   < 0.0120


 


NT-Pro-B Natriuret Pep   52.9


 


Total Protein   7.2


 


Albumin   4.3


 


Globulin   3.0


 


Albumin/Globulin Ratio   1.4














Assessment & Plan





- Assessment and Plan (Free Text)


Assessment: 





COPD: 


Albuterol/Ipratropium q4h PRN  


Montelukast 4 mg PO qd scheduled


Duoneb 3 ml INH q4h PRN 





Nausea/Vomiting: 


Zofran 4 mg PRN q8h


Electrolytes WNL, except K (3.4). Replaced with KCl 10 meq





Chest pain: 


Patient denies chest pain at time of admission.


CXR and EKG reviewed. Troponin < 0.0120. 





BPH: 


Tamsulosin 0.4 mg PO qd scheduled





HTN:


Lisinopril 20 mg PO qd scheduled





HLD: 


Rosuvastatin 10 mg PO scheduled





PPX:


DVT PPX: Heparin 5000 units sq q8h scheduled


GI PPX: Pantoprazole 40 mg PO qd scheduled

## 2018-08-24 NOTE — RAD
HISTORY:

SOB  



COMPARISON:

Chest x-ray performed 6/30/18 



TECHNIQUE:

Chest PA and lateral



FINDINGS:





LUNGS:

Hyperinflation possibly secondary to underlying emphysema and or 

COPD. Mild bibasilar atelectasis and or scarring changes. Biapical 

pleural thickening. 



Please note that chest x-ray has limited sensitivity for the 

detection of pulmonary masses.



PLEURA:

No significant pleural effusion identified. No definite pneumothorax .



CARDIOVASCULAR:

Cardiomegaly.



OSSEOUS STRUCTURES:

 Osseous demineralization.  Degenerative changes. Deformity of the 

theron thorax likely due to scoliosis. 



VISUALIZED UPPER ABDOMEN:

Unremarkable.



OTHER FINDINGS:

None.



IMPRESSION:

Hyperinflation may be related to emphysema/COPD.  Bibasilar 

atelectasis and/or scarring changes.  Biapical pleural thickening.

## 2018-08-25 VITALS
OXYGEN SATURATION: 96 % | DIASTOLIC BLOOD PRESSURE: 65 MMHG | TEMPERATURE: 97.5 F | SYSTOLIC BLOOD PRESSURE: 109 MMHG | RESPIRATION RATE: 20 BRPM

## 2018-08-25 VITALS — HEART RATE: 53 BPM

## 2018-08-25 LAB
ALBUMIN SERPL-MCNC: 3.7 G/DL (ref 3.5–5)
ALBUMIN/GLOB SERPL: 1.4 {RATIO} (ref 1–2.1)
ALT SERPL-CCNC: 22 U/L (ref 21–72)
AST SERPL-CCNC: 11 U/L (ref 17–59)
BASOPHILS # BLD AUTO: 0 K/UL (ref 0–0.2)
BASOPHILS NFR BLD: 0.4 % (ref 0–2)
BUN SERPL-MCNC: 22 MG/DL (ref 9–20)
CALCIUM SERPL-MCNC: 8.5 MG/DL (ref 8.6–10.4)
CK MB SERPL-MCNC: 0.89 NG/ML (ref 0–3.38)
EOSINOPHIL # BLD AUTO: 0 K/UL (ref 0–0.7)
EOSINOPHIL NFR BLD: 0.2 % (ref 0–4)
ERYTHROCYTE [DISTWIDTH] IN BLOOD BY AUTOMATED COUNT: 14 % (ref 11.5–14.5)
GFR NON-AFRICAN AMERICAN: > 60
HGB BLD-MCNC: 12.9 G/DL (ref 12–18)
LYMPHOCYTES # BLD AUTO: 1.7 K/UL (ref 1–4.3)
LYMPHOCYTES NFR BLD AUTO: 22.8 % (ref 20–40)
MCH RBC QN AUTO: 31.2 PG (ref 27–31)
MCHC RBC AUTO-ENTMCNC: 34.3 G/DL (ref 33–37)
MCV RBC AUTO: 90.9 FL (ref 80–94)
MONOCYTES # BLD: 0.8 K/UL (ref 0–0.8)
MONOCYTES NFR BLD: 10.8 % (ref 0–10)
NEUTROPHILS # BLD: 5 K/UL (ref 1.8–7)
NEUTROPHILS NFR BLD AUTO: 65.8 % (ref 50–75)
NRBC BLD AUTO-RTO: 0 % (ref 0–2)
PLATELET # BLD: 221 K/UL (ref 130–400)
PMV BLD AUTO: 8.1 FL (ref 7.2–11.7)
RBC # BLD AUTO: 4.12 MIL/UL (ref 4.4–5.9)
WBC # BLD AUTO: 7.6 K/UL (ref 4.8–10.8)

## 2018-08-25 RX ADMIN — PANTOPRAZOLE SODIUM SCH MG: 40 TABLET, DELAYED RELEASE ORAL at 09:31

## 2018-08-25 RX ADMIN — POTASSIUM CHLORIDE SCH MEQ: 20 TABLET, EXTENDED RELEASE ORAL at 09:31

## 2018-08-25 RX ADMIN — IPRATROPIUM BROMIDE AND ALBUTEROL SULFATE PRN ML: .5; 3 SOLUTION RESPIRATORY (INHALATION) at 08:24

## 2018-08-25 RX ADMIN — FLUTICASONE PROPIONATE AND SALMETEROL SCH: 50; 250 POWDER RESPIRATORY (INHALATION) at 08:23

## 2018-08-25 NOTE — CP.PCM.DIS
Provider





- Provider


Date of Admission: 


08/24/18 11:12





Attending physician: 


Jorge Oakley Jr, MD





Primary care physician: 





Dr. IDA Oakley


Time Spent in preparation of Discharge (in minutes): 45





Diagnosis





- Discharge Diagnosis


(1) HTN (hypertension)


Status: Acute   





(2) HLD (hyperlipidemia)


Status: Acute   





(3) BPH (benign prostatic hyperplasia)


Status: Acute   





(4) COPD (chronic obstructive pulmonary disease)


Status: Acute   





(5) Chest pain


Status: Acute   





Hospital Course





- Lab Results


Lab Results: 


 Most Recent Lab Values











WBC  7.6 K/uL (4.8-10.8)   08/25/18  07:16    


 


RBC  4.12 Mil/uL (4.40-5.90)  L  08/25/18  07:16    


 


Hgb  12.9 g/dL (12.0-18.0)   08/25/18  07:16    


 


Hct  37.4 % (35.0-51.0)   08/25/18  07:16    


 


MCV  90.9 fL (80.0-94.0)   08/25/18  07:16    


 


MCH  31.2 pg (27.0-31.0)  H  08/25/18  07:16    


 


MCHC  34.3 g/dL (33.0-37.0)   08/25/18  07:16    


 


RDW  14.0 % (11.5-14.5)   08/25/18  07:16    


 


Plt Count  221 K/uL (130-400)   08/25/18  07:16    


 


MPV  8.1 fL (7.2-11.7)   08/25/18  07:16    


 


Neut % (Auto)  65.8 % (50.0-75.0)   08/25/18  07:16    


 


Lymph % (Auto)  22.8 % (20.0-40.0)   08/25/18  07:16    


 


Mono % (Auto)  10.8 % (0.0-10.0)  H  08/25/18  07:16    


 


Eos % (Auto)  0.2 % (0.0-4.0)   08/25/18  07:16    


 


Baso % (Auto)  0.4 % (0.0-2.0)   08/25/18  07:16    


 


Neut # (Auto)  5.0 K/uL (1.8-7.0)   08/25/18  07:16    


 


Lymph # (Auto)  1.7 K/uL (1.0-4.3)   08/25/18  07:16    


 


Mono # (Auto)  0.8 K/uL (0.0-0.8)   08/25/18  07:16    


 


Eos # (Auto)  0.0 K/uL (0.0-0.7)   08/25/18  07:16    


 


Baso # (Auto)  0.0 K/uL (0.0-0.2)   08/25/18  07:16    


 


Sodium  141 mmol/L (132-148)   08/25/18  07:16    


 


Potassium  3.7 mmol/L (3.6-5.2)   08/25/18  07:16    


 


Chloride  103 mmol/L ()   08/25/18  07:16    


 


Carbon Dioxide  27 mmol/L (22-30)   08/25/18  07:16    


 


Anion Gap  14  (10-20)   08/25/18  07:16    


 


BUN  22 mg/dL (9-20)  H  08/25/18  07:16    


 


Creatinine  0.8 mg/dL (0.8-1.5)   08/25/18  07:16    


 


Est GFR ( Amer)  > 60   08/25/18  07:16    


 


Est GFR (Non-Af Amer)  > 60   08/25/18  07:16    


 


POC Glucose (mg/dL)  128 mg/dL ()  H  08/25/18  16:45    


 


Random Glucose  92 mg/dL ()   08/25/18  07:16    


 


Calcium  8.5 mg/dl (8.6-10.4)  L  08/25/18  07:16    


 


Total Bilirubin  0.9 mg/dL (0.2-1.3)   08/25/18  07:16    


 


AST  11 U/L (17-59)  L D 08/25/18  07:16    


 


ALT  22 U/L (21-72)   08/25/18  07:16    


 


Alkaline Phosphatase  44 U/L ()   08/25/18  07:16    


 


Total Creatine Kinase  51 U/L ()  L  08/25/18  16:47    


 


CK-MB (Mass)  0.89 ng/mL (0.0-3.38)   08/25/18  16:47    


 


Troponin I  < 0.0120 ng/mL (0.00-0.120)   08/25/18  16:47    


 


NT-Pro-B Natriuret Pep  52.9 pg/mL (0-900)   08/24/18  09:54    


 


Total Protein  6.3 g/dL (6.3-8.3)   08/25/18  07:16    


 


Albumin  3.7 g/dL (3.5-5.0)   08/25/18  07:16    


 


Globulin  2.6 gm/dL (2.2-3.9)   08/25/18  07:16    


 


Albumin/Globulin Ratio  1.4  (1.0-2.1)   08/25/18  07:16    














- Hospital Course


Hospital Course: 





[facility] 


HPI: Patient is a 78 year old male with a PMH significant for COPD, Asthma, BPH

, GERD, HTN and HLD who presents to the ED reporting that over the past 5-6 

days he has had persistent nausea and vomiting after eating and drinking. He 

notes when he feels hungry, he eats his food and then shortly after eating, he 

has onset of throat pain, chest pain, a subjective fever and feeling unwell, 

which is followed by an episode of vomiting and shortness of breath. He ran out 

of his COPD medications at home, which he uses as needed. He presents to the ED 

today because he was concerned about how long his symptoms were persisting. He 

denies diarrhea, constipation, dizziness, palpitations, changes in his vision 

or abdominal pain. History difficult to obtain - patient states he has some 

memory problems and has a neurologist, but is uncertain what he sees the 

neurologist for.





Briefly, on admission, patient was noted to be short of breath and had chest 

pain.  Chest X ray showed hyperinflated lungs and biapical pleural thickening.  

Initial troponin and EKG were negative.  Pro-BNP was also normal.  Patient was 

started on IV steroid which improved his breathing.  Patient was also started 

on home medications.  Patient was seen walking around the hospital floor 

without difficulty or dyspnea.  His breathing improved.  He was deemed stable 

for discharge.





Patient stable for discharge per Dr. Oakley


Patient is to follow up with primary care physician upon discharge.  If patient 

does not have a primary care physician, referral is provided for Dr. Oakley.  





Patient is discharged with the following medications: Afluzosin Hcl ER 10 mg PO 

QD, Albuterol inhaler 3 mg IH QD, Advair diskus2 puff BID, Singulair 10 mg po QD

, Proscar 5 mg po QD, Zestoretic 20-12.5 mg po QD, Protonix 40 mg po QD, 

Crestor 10 mg po HS.





Please return to ED, if symptoms return.  





- Date & Time of H&P


Date of H&P: 08/25/18


Time of H&P: 17:52





Discharge Exam





- Head Exam


Head Exam: ATRAUMATIC, NORMAL INSPECTION





- ENT Exam


ENT Exam: Mucous Membranes Moist





- Respiratory Exam


Respiratory Exam: Clear to PA & Lateral, NORMAL BREATHING PATTERN.  absent: 

Rales, Rhonchi, Wheezes





- Cardiovascular Exam


Cardiovascular Exam: REGULAR RHYTHM, +S1, +S2.  absent: Diastolic murmur, Gallop

, Rubs, Systolic Murmur





- GI/Abdominal Exam


GI & Abdominal Exam: Normal Bowel Sounds, Soft, Unremarkable.  absent: Distended

, Firm, Guarding, Rigid, Tenderness





- Extremities Exam


Additional comments: 





no edema or tenderness 





- Neurological Exam


Neurological exam: Alert, Oriented x3





- Psychiatric Exam


Psychiatric exam: Normal Affect, Normal Mood





- Skin


Skin Exam: Dry, Intact, Normal Color, Warm





Discharge Plan





- Discharge Medications


Prescriptions: 


Albuterol 0.083% [Albuterol 0.083% Inhal Sol (2.5 mg/3 ml) UD] 3 ml IH DAILY #1 

neb


Alfuzosin HCl [Alfuzosin HCl ER] 10 mg PO DAILY #30 tab.er.24h


Finasteride [Proscar] 5 mg PO DAILY #30 tab


Fluticasone/Salmeterol [Advair 250-50 Diskus] 2 puff IH BID #1 blst.w.dev


Lisinopril/Hydrochlorothiazide [Zestoretic 20-12.5 mg Tablet] 1 each PO DAILY #

30 tablet


Montelukast Sodium [Singulair] 10 mg PO DAILY #30 tablet


Pantoprazole [Protonix EC Tab] 40 mg PO DAILY #30 ect


Rosuvastatin Calcium [Crestor] 10 mg PO HS #30 tab





- Follow Up Plan


Condition: FAIR


Disposition: HOME/ ROUTINE


Additional Instructions: 


Patient stable for discharge per Dr. Oakley


Patient is to follow up with primary care physician upon discharge.  If patient 

does not have a primary care physician, referral is provided for Dr. Oakley.  





Patient is discharged with the following medications: Afluzosin Hcl ER 10 mg PO 

QD, Albuterol inhaler 3 mg IH QD, Advair diskus2 puff BID, Singulair 10 mg po QD

, Proscar 5 mg po QD, Zestoretic 20-12.5 mg po QD, Protonix 40 mg po QD, 

Crestor 10 mg po HS.





Please return to ED, if symptoms return.  


Referrals: 


Jorge Oakley Jr., MD [Medical Doctor] -

## 2018-09-03 ENCOUNTER — HOSPITAL ENCOUNTER (EMERGENCY)
Dept: HOSPITAL 31 - C.ER | Age: 78
Discharge: HOME | End: 2018-09-03
Payer: MEDICARE

## 2018-09-03 VITALS — BODY MASS INDEX: 21.6 KG/M2

## 2018-09-03 VITALS
OXYGEN SATURATION: 97 % | DIASTOLIC BLOOD PRESSURE: 75 MMHG | SYSTOLIC BLOOD PRESSURE: 145 MMHG | HEART RATE: 52 BPM | TEMPERATURE: 98.2 F

## 2018-09-03 VITALS — RESPIRATION RATE: 16 BRPM

## 2018-09-03 DIAGNOSIS — R10.84: ICD-10-CM

## 2018-09-03 DIAGNOSIS — K59.00: Primary | ICD-10-CM

## 2018-09-03 LAB
ALBUMIN SERPL-MCNC: 4.1 G/DL (ref 3.5–5)
ALBUMIN/GLOB SERPL: 1.5 {RATIO} (ref 1–2.1)
ALT SERPL-CCNC: 20 U/L (ref 21–72)
AST SERPL-CCNC: 16 U/L (ref 17–59)
BASOPHILS # BLD AUTO: 0.1 K/UL (ref 0–0.2)
BASOPHILS NFR BLD: 1.1 % (ref 0–2)
BILIRUB UR-MCNC: NEGATIVE MG/DL
BUN SERPL-MCNC: 20 MG/DL (ref 9–20)
CALCIUM SERPL-MCNC: 9.9 MG/DL (ref 8.6–10.4)
EOSINOPHIL # BLD AUTO: 0.1 K/UL (ref 0–0.7)
EOSINOPHIL NFR BLD: 0.9 % (ref 0–4)
ERYTHROCYTE [DISTWIDTH] IN BLOOD BY AUTOMATED COUNT: 14.5 % (ref 11.5–14.5)
GFR NON-AFRICAN AMERICAN: > 60
GLUCOSE UR STRIP-MCNC: NORMAL MG/DL
HGB BLD-MCNC: 13.4 G/DL (ref 12–18)
LEUKOCYTE ESTERASE UR-ACNC: (no result) LEU/UL
LIPASE: 45 U/L (ref 23–300)
LYMPHOCYTES # BLD AUTO: 1.5 K/UL (ref 1–4.3)
LYMPHOCYTES NFR BLD AUTO: 18.9 % (ref 20–40)
MCH RBC QN AUTO: 30.6 PG (ref 27–31)
MCHC RBC AUTO-ENTMCNC: 33.6 G/DL (ref 33–37)
MCV RBC AUTO: 91.2 FL (ref 80–94)
MONOCYTES # BLD: 0.7 K/UL (ref 0–0.8)
MONOCYTES NFR BLD: 9 % (ref 0–10)
NEUTROPHILS # BLD: 5.6 K/UL (ref 1.8–7)
NEUTROPHILS NFR BLD AUTO: 70.1 % (ref 50–75)
NRBC BLD AUTO-RTO: 0 % (ref 0–2)
PH UR STRIP: 5 [PH] (ref 5–8)
PLATELET # BLD: 256 K/UL (ref 130–400)
PMV BLD AUTO: 9.2 FL (ref 7.2–11.7)
PROT UR STRIP-MCNC: NEGATIVE MG/DL
RBC # BLD AUTO: 4.39 MIL/UL (ref 4.4–5.9)
RBC # UR STRIP: (no result) /UL
SP GR UR STRIP: 1.03 (ref 1–1.03)
SQUAMOUS EPITHIAL: < 1 /HPF (ref 0–5)
UROBILINOGEN UR-MCNC: NORMAL MG/DL (ref 0.2–1)
WBC # BLD AUTO: 7.9 K/UL (ref 4.8–10.8)

## 2018-09-03 PROCEDURE — 80053 COMPREHEN METABOLIC PANEL: CPT

## 2018-09-03 PROCEDURE — 99285 EMERGENCY DEPT VISIT HI MDM: CPT

## 2018-09-03 PROCEDURE — 96374 THER/PROPH/DIAG INJ IV PUSH: CPT

## 2018-09-03 PROCEDURE — 74177 CT ABD & PELVIS W/CONTRAST: CPT

## 2018-09-03 PROCEDURE — 85025 COMPLETE CBC W/AUTO DIFF WBC: CPT

## 2018-09-03 PROCEDURE — 71045 X-RAY EXAM CHEST 1 VIEW: CPT

## 2018-09-03 PROCEDURE — 96375 TX/PRO/DX INJ NEW DRUG ADDON: CPT

## 2018-09-03 PROCEDURE — 83690 ASSAY OF LIPASE: CPT

## 2018-09-03 PROCEDURE — 93005 ELECTROCARDIOGRAM TRACING: CPT

## 2018-09-03 PROCEDURE — 81001 URINALYSIS AUTO W/SCOPE: CPT

## 2018-09-03 NOTE — C.PDOC
History Of Present Illness


79 y/o male presents to the ED complaining of diffuse abdominal pain for "many 

days". Associated with vomiting and subjective fever. Patient is a poor 

historian. Also reports some difficulty urinating. Records reviewed, and PMHx 

is significant for COPD, Asthma, BPH, GERD, HTN and HLD. Prior surgical history 

includes Appendectomy and Prostate Surgery. 








Time Seen by Provider: 18 12:16


Chief Complaint (Nursing): Shortness Of Breath


History Per: Patient


History/Exam Limitations: no limitations


Onset/Duration Of Symptoms: Days


Current Symptoms Are (Timing): Still Present





Past Medical History


Reviewed: Historical Data, Nursing Documentation, Vital Signs


Vital Signs: 


 Last Vital Signs











Temp  98.3 F   18 11:28


 


Pulse  51 L  18 15:27


 


Resp  16   18 15:27


 


BP  146/78   18 15:27


 


Pulse Ox  96   18 16:15














- Medical History


PMH: Anxiety, Asthma, Benign Prostatic Hyperplasia, COPD, Depression, Gastritis

, HTN, Hypercholesterolemia


   Denies: Atrial Fibrillation, Cardia Arrhythmia, CHF, Chronic Kidney Disease


Surgical History: Appendectomy





- [facility] Procedures








INJECT/INFUSE NEC (14)


SUPPLEMENT ABDOMINAL WALL WITH SYNTH SUB, PERC ENDO APPROACH (10/29/15)


VACCINATION NEC (13)








Family History: States: Unknown Family Hx





- Social History


Hx Tobacco Use: No


Hx Alcohol Use: No


Hx Substance Use: No





- Immunization History


Hx Tetanus Toxoid Vaccination: Yes


Hx Influenza Vaccination: Yes (2017)


Hx Pneumococcal Vaccination: No





Review Of Systems


Except As Marked, All Systems Reviewed And Found Negative.


Constitutional: Positive for: Fever (subjective)


Eyes: Negative for: Vision Change


Cardiovascular: Negative for: Chest Pain


Respiratory: Negative for: Shortness of Breath


Gastrointestinal: Positive for: Nausea, Vomiting, Abdominal Pain.  Negative for

: Diarrhea, Hematochezia, Hematemesis


Genitourinary: Positive for: Dysuria, Other (Urinary retention)


Neurological: Negative for: Weakness, Change in Speech, Dizziness





Physical Exam





- Physical Exam


Appears: Non-toxic, No Acute Distress


Skin: Normal Color, Warm, Dry


Head: Atraumatic, Normacephalic


Eye(s): bilateral: Normal Inspection, PERRL, EOMI


Nose: Normal


Oral Mucosa: Moist


Neck: Supple


Chest: Symmetrical


Cardiovascular: Rhythm Regular, No Murmur


Respiratory: Normal Breath Sounds, No Rales, No Rhonchi, No Wheezing, Other (

NARD)


Gastrointestinal/Abdominal: Soft, Tenderness (diffuse tenderness throughout), 

No Distention, No Guarding, No Rebound


Back: Normal Inspection, No CVA Tenderness


Extremity: Bilateral: Atraumatic, Normal Color And Temperature, Normal ROM


Neurological/Psych: Oriented x3, Normal Speech





ED Course And Treatment





- Laboratory Results


Result Diagrams: 


 18 12:48





 18 12:48


ECG: Interpreted By Me, Viewed By Me


ECG Rhythm: Sinus Bradycardia


ECG Interpretation: No Acute Changes


Rate From EC


O2 Sat by Pulse Oximetry: 96 (RA)


Pulse Ox Interpretation: Normal





- Radiology


CXR: Interpreted by Me, Viewed By Me


CXR Interpretation: Yes: Other (No changes from prior)





- CT Scan/US


  ** CT Abd/Pelvis


Other Rad Studies (CT/US): Read By Radiologist, Radiology Report Reviewed


CT/US Interpretation: Accession No. : [id number].  Patient Name / ID : 

JENNA LOOMIS  / [id number].  Exam Date : 2018 14:36:04 ( 

Approved ).  Study Comment :  Sex / Age : M  / 78Y.  Creator : Celestine Koenig MD.  Dictator :   :  Approver : Celestine Koenig MD.  

Approver2 :  Report Date : 2018 16:06:41.  My Comment :  *****************

******************************************************************.  Date of 

service:  2018.  PROCEDURE:  CT Abdomen and Pelvis with contrast.  HISTORY

:  Abdominal pain.  COMPARISON:  Comparison made with CT scan abdomen pelvis 2016.  TECHNIQUE:  Contrast dose: 100 cc Visipaque 320 .  Radiation dose:  

Total exam DLP = 336.62 mGy-cm. .  This CT exam was performed using one or more 

of the following dose reduction techniques: Automated exposure control, 

adjustment of the mA and/or kV according to patient size, and/or use of 

iterative reconstruction technique. .  FINDINGS:  LOWER THORAX:  The heart is 

enlarged. No significant pericardial effusion.  There is a small hiatal hernia 

with wall thickening of the distal esophagus likely due to protrusion gastric 

mucosa.  Possibility of esophagitis or other intrinsic/invasive wall lesion not 

excluded.  Oral contrast material is also present within the distal esophagus 

likely due to reflux.  Mild passive/dependent type atelectasis both lung bases.

  There also some linear areas of scarring as well.  LIVER:  Liver exhibits 

normal size measuring approximately 16 0.4 cm in CC dimension. There are a few 

tiny round/elliptical shaped foci of low attenuation scattered throughout the 

hepatic parenchyma most which too small to characterize.  The largest focus 

along the capsular surface inferior aspect right lobe measures approximately 12 

mm with Hounsfield units in the mid 40s. . This lesion was present on the prior 

exam and appears essentially unchanged.  Few additional foci were also seen on 

the prior exam.  Continued interval follow-up recommended to assess stability.  

Alternately, nonemergent triple phase CT scan liver could be performed further 

evaluation.  Mild fatty hepatic infiltration. Minimal central intrahepatic 

portal edema. Portal and splenic veins are opacified.  GALLBLADDER AND BILE 

DUCTS:  Gallbladder appears incompletely distended.  PANCREAS:  Pancreas 

appears unremarkable without masses collections or calcifications.  SPLEEN:  

Unremarkable.  ADRENALS:  No adrenal lesions.  KIDNEYS AND URETERS:  Kidneys 

demonstrate symmetric nephrograms.  No evidence of nephrolithiasis or 

hydronephrosis.  VASCULATURE:  Unremarkable. No aortic aneurysm. Minor 

partially calcified atherosclerotic plaque changes seen along the abdominal 

aorta and iliac arteries.  BOWEL:  Evaluation of the bowel is limited due to 

incomplete opacification.  Stomach is distended with liquid contrast material 

and air.  Visualized loops of small bowel exhibit normal contour and caliber. 

No evidence of acute mechanical small bowel obstruction.  There is a large 

amount of stool within the cecum ascending and transverse colon consistent with 

fecal retention/ constipation. No definitive mural wall thickening.  APPENDIX:  

Appendix is not seen consistent with this patient's history of appendectomy.  

PERITONEUM:  Unremarkable. No free fluid. No free air.  LYMPH NODES:  

Unremarkable. No enlarged lymph nodes.  BLADDER:  Urinary bladder incompletely 

distended which in part accounts for thick-walled appearance. Muscular 

hypertrophy presumably contributes . Note the possibility of other intrinsic/

invasive wall lesion not excluded.  REPRODUCTIVE:  Prostate gland is enlarged 

on encroach into the floor of the urinary bladder.  Questionable prior TUR.  

BONES:  Multilevel degenerative spondylosis of the thoracic spine with a 

moderate levoscoliosis centered at the L5-S1 level and mild to moderate 

dextroscoliosis centered at the L1-L2 level.  OTHER FINDINGS:  None.  IMPRESSION

:  There is a small hiatal hernia with oral contrast material in the distal 

esophagus likely due to reflux.  Wall thickening of distal esophagus may be due 

to protrusion gastric mucosa however esophagitis or other intrinsic/invasive 

wall lesion not excluded.  Clinical correlation recommended to determine 

whether additional imaging including at endoscopy is not required.  There are 

multiple small foci of low attenuation scattered throughout the hepatic 

parenchyma few which were seen on prior study and appears stable.  Findings 

could represent small hemangiomas or slightly hyperdense cyst.  Continued follow

-up could be performed.  Mild fatty hepatic infiltration.  Findings consistent 

with constipation.  Appendix not seen consistent with this patient's history of 

appendectomy.  Enlarged prostate gland questionable TUR.  Urinary bladder wall 

thickening likely due to incomplete distention and muscular hypertrophy however 

other intrinsic/invasive wall lesion not excluded.  See above discussion for 

other additional findings





Progress





- Re-Evaluation


Re-evaluation Note: 





18 16:18


ambul wo diff nad. CT REPORT D/W PT, VOICES UNDERSTANDING OF NEED FOR FU





- Data Reviewed


Data Reviewed: Lab, Diagnostic imaging, EKG, Old records





Medical Decision Making


Medical Decision Making: 





Initial Plan:


--Labs


--EKG


--Chest X-Ray


--UA


--IV fluids


--Zofran 4 mg IVP


--Morphine 4 mg IVP


--CT Abd/Pelvis with PO&IV contrast











Disposition


Counseled Patient/Family Regarding: Studies Performed, Diagnosis, Need For 

Followup, Rx Given





- Disposition


Referrals: 


YOUR,PMD [Other]


Disposition: HOME/ ROUTINE


Disposition Time: 16:20


Condition: IMPROVED


Prescriptions: 


Magnesium Citrate [[facility] Magnesium Citrate] 300 ml PO ONCE #1 

bottle


Instructions:  Constipation, Adult (DC)


Forms:  [email or web address] (English)


Print Language: French





- Clinical Impression


Clinical Impression: 


 Constipation, Abdominal pain








- Scribe Statement


The provider has reviewed the documentation as recorded by the Ruiz Mckenna





Provider Attestation: 





All medical record entries made by the Ruiz were at my direction and 

personally dictated by me. I have reviewed the chart and agree that the record 

accurately reflects my personal performance of the history, physical exam, 

medical decision making, and the department course for this patient. I have 

also personally directed, reviewed, and agree with the discharge instructions 

and disposition.

## 2018-09-03 NOTE — RAD
Date of service: 



09/03/2018



PROCEDURE:  CHEST RADIOGRAPH, 1 VIEW



HISTORY:

abd pain



COMPARISON:

Chest dated 08/24/2018



FINDINGS:



LUNGS:

Hyperinflation.  Mild bibasilar atelectasis. .  Hazy appearance of 

the entire left theron thorax possibly due to patient positioning and 

dextroscoliosis centered at the lower thoracic region.



PLEURA:

No pneumothorax or pleural fluid seen.



CARDIOVASCULAR:

Heart remains enlarged.



OSSEOUS STRUCTURES:

No significant abnormalities.



VISUALIZED UPPER ABDOMEN:

Normal.



OTHER FINDINGS:

None. 



IMPRESSION:

Hyperinflation.  Mild bibasilar atelectasis. .  Hazy appearance of 

the entire left theron thorax possibly due to patient positioning and 

dextroscoliosis centered at the lower thoracic region.



Cardiomegaly 



Moderate dextroscoliosis centered in the lower thoracic region limits 

evaluation to some degree.

## 2018-09-03 NOTE — CT
Date of service: 



09/03/2018



PROCEDURE:  CT Abdomen and Pelvis with contrast



HISTORY:

Abdominal pain



COMPARISON:

Comparison made with CT scan abdomen pelvis 12/12/2016



TECHNIQUE:

Contrast dose: 100 cc Visipaque 320 . 



Radiation dose:



Total exam DLP = 336.62 mGy-cm. . 



This CT exam was performed using one or more of the following dose 

reduction techniques: Automated exposure control, adjustment of the 

mA and/or kV according to patient size, and/or use of iterative 

reconstruction technique. . 



FINDINGS:



LOWER THORAX:

The heart is enlarged. No significant pericardial effusion.  There is 

a small hiatal hernia with wall thickening of the distal esophagus 

likely due to protrusion gastric mucosa.  Possibility of esophagitis 

or other intrinsic/invasive wall lesion not excluded.  Oral contrast 

material is also present within the distal esophagus likely due to 

reflux. 



Mild passive/dependent type atelectasis both lung bases.  There also 

some linear areas of scarring as well. 



LIVER:

Liver exhibits normal size measuring approximately 16 0.4 cm in CC 

dimension. There are a few tiny round/elliptical shaped foci of low 

attenuation scattered throughout the hepatic parenchyma most which 

too small to characterize.  The largest focus along the capsular 

surface inferior aspect right lobe measures approximately 12 mm with 

Hounsfield units in the mid 40s. . This lesion was present on the 

prior exam and appears essentially unchanged.  Few additional foci 

were also seen on the prior exam.  Continued interval follow-up 

recommended to assess stability.  Alternately, nonemergent triple 

phase CT scan liver could be performed further evaluation. 



Mild fatty hepatic infiltration. Minimal central intrahepatic portal 

edema. Portal and splenic veins are opacified. 



GALLBLADDER AND BILE DUCTS:

Gallbladder appears incompletely distended 



PANCREAS:

Pancreas appears unremarkable without masses collections or 

calcifications. 



SPLEEN:

Unremarkable. 



ADRENALS:

No adrenal lesions. 



KIDNEYS AND URETERS:

Kidneys demonstrate symmetric nephrograms.  No evidence of 

nephrolithiasis or hydronephrosis 



VASCULATURE:

Unremarkable. No aortic aneurysm. Minor partially calcified 

atherosclerotic plaque changes seen along the abdominal aorta and 

iliac arteries. 



BOWEL:

Evaluation of the bowel is limited due to incomplete opacification.  

Stomach is distended with liquid contrast material and air.  

Visualized loops of small bowel exhibit normal contour and caliber. 

No evidence of acute mechanical small bowel obstruction.



There is a large amount of stool within the cecum ascending and 

transverse colon consistent with fecal retention/ constipation. No 

definitive mural wall thickening 



APPENDIX:

Appendix is not seen consistent with this patient's history of 

appendectomy 



PERITONEUM:

Unremarkable. No free fluid. No free air. 



LYMPH NODES:

Unremarkable. No enlarged lymph nodes. 



BLADDER:

Urinary bladder incompletely distended which in part accounts for 

thick-walled appearance. Muscular hypertrophy presumably contributes 

. Note the possibility of other intrinsic/invasive wall lesion not 

excluded. 



REPRODUCTIVE:

Prostate gland is enlarged on encroach into the floor of the urinary 

bladder.  Questionable prior TUR. 



BONES:

Multilevel degenerative spondylosis of the thoracic spine with a 

moderate levoscoliosis centered at the L5-S1 level and mild to 

moderate dextroscoliosis centered at the L1-L2 level. 



OTHER FINDINGS:

None.



IMPRESSION:

There is a small hiatal hernia with oral contrast material in the 

distal esophagus likely due to reflux.  Wall thickening of distal 

esophagus may be due to protrusion gastric mucosa however esophagitis 

or other intrinsic/invasive wall lesion not excluded.  Clinical 

correlation recommended to determine whether additional imaging 

including at endoscopy is not required. 



There are multiple small foci of low attenuation scattered throughout 

the hepatic parenchyma few which were seen on prior study and appears 

stable.  Findings could represent small hemangiomas or slightly 

hyperdense cyst.  Continued follow-up could be performed. 



Mild fatty hepatic infiltration. 



Findings consistent with constipation. 



Appendix not seen consistent with this patient's history of 

appendectomy. 



Enlarged prostate gland questionable TUR.  Urinary bladder wall 

thickening likely due to incomplete distention and muscular 

hypertrophy however other intrinsic/invasive wall lesion not 

excluded. 



See above discussion for other additional findings

## 2018-09-05 NOTE — CARD
--------------- APPROVED REPORT --------------





Date of service: 09/03/2018



EKG Measurement

Heart [id number]

HI [id number]

[id number]

[id number]

[id number]



<Conclusion>

Sinus bradycardia with short HI

Otherwise normal ECG

## 2018-09-18 ENCOUNTER — HOSPITAL ENCOUNTER (EMERGENCY)
Dept: HOSPITAL 14 - H.ER | Age: 78
Discharge: HOME | End: 2018-09-18
Payer: MEDICARE

## 2018-09-18 VITALS
HEART RATE: 73 BPM | RESPIRATION RATE: 18 BRPM | TEMPERATURE: 99 F | DIASTOLIC BLOOD PRESSURE: 69 MMHG | OXYGEN SATURATION: 97 % | SYSTOLIC BLOOD PRESSURE: 103 MMHG

## 2018-09-18 VITALS — BODY MASS INDEX: 21.6 KG/M2

## 2018-09-18 DIAGNOSIS — I10: ICD-10-CM

## 2018-09-18 DIAGNOSIS — N40.1: ICD-10-CM

## 2018-09-18 DIAGNOSIS — R10.9: Primary | ICD-10-CM

## 2018-09-18 DIAGNOSIS — K59.00: ICD-10-CM

## 2018-09-18 DIAGNOSIS — E78.00: ICD-10-CM

## 2018-09-18 LAB
ALBUMIN SERPL-MCNC: 3.7 G/DL (ref 3.5–5)
ALBUMIN/GLOB SERPL: 1.2 {RATIO} (ref 1–2.1)
ALT SERPL-CCNC: 29 U/L (ref 21–72)
AST SERPL-CCNC: 18 U/L (ref 17–59)
BASOPHILS # BLD AUTO: 0.1 K/UL (ref 0–0.2)
BASOPHILS NFR BLD: 2.1 % (ref 0–2)
BUN SERPL-MCNC: 19 MG/DL (ref 9–20)
CALCIUM SERPL-MCNC: 9.6 MG/DL (ref 8.4–10.2)
EOSINOPHIL # BLD AUTO: 0.1 K/UL (ref 0–0.7)
EOSINOPHIL NFR BLD: 1.5 % (ref 0–4)
ERYTHROCYTE [DISTWIDTH] IN BLOOD BY AUTOMATED COUNT: 14.1 % (ref 11.5–14.5)
GFR NON-AFRICAN AMERICAN: > 60
HGB BLD-MCNC: 13.9 G/DL (ref 12–18)
LIPASE SERPL-CCNC: 53 U/L (ref 23–300)
LYMPHOCYTES # BLD AUTO: 2.1 K/UL (ref 1–4.3)
LYMPHOCYTES NFR BLD AUTO: 29.9 % (ref 20–40)
MCH RBC QN AUTO: 31.5 PG (ref 27–31)
MCHC RBC AUTO-ENTMCNC: 34.5 G/DL (ref 33–37)
MCV RBC AUTO: 91.2 FL (ref 80–94)
MONOCYTES # BLD: 0.7 K/UL (ref 0–0.8)
MONOCYTES NFR BLD: 9.2 % (ref 0–10)
NEUTROPHILS # BLD: 4.1 K/UL (ref 1.8–7)
NEUTROPHILS NFR BLD AUTO: 57.3 % (ref 50–75)
NRBC BLD AUTO-RTO: 0.1 % (ref 0–0)
PLATELET # BLD: 240 K/UL (ref 130–400)
PMV BLD AUTO: 7.7 FL (ref 7.2–11.7)
RBC # BLD AUTO: 4.42 MIL/UL (ref 4.4–5.9)
WBC # BLD AUTO: 7.1 K/UL (ref 4.8–10.8)

## 2018-09-18 PROCEDURE — 85025 COMPLETE CBC W/AUTO DIFF WBC: CPT

## 2018-09-18 PROCEDURE — 83690 ASSAY OF LIPASE: CPT

## 2018-09-18 PROCEDURE — 93005 ELECTROCARDIOGRAM TRACING: CPT

## 2018-09-18 PROCEDURE — 99281 EMR DPT VST MAYX REQ PHY/QHP: CPT

## 2018-09-18 PROCEDURE — 96361 HYDRATE IV INFUSION ADD-ON: CPT

## 2018-09-18 PROCEDURE — 74177 CT ABD & PELVIS W/CONTRAST: CPT

## 2018-09-18 PROCEDURE — 96374 THER/PROPH/DIAG INJ IV PUSH: CPT

## 2018-09-18 PROCEDURE — 80053 COMPREHEN METABOLIC PANEL: CPT

## 2018-09-18 NOTE — CARD
--------------- APPROVED REPORT --------------





Date of service: 09/18/2018



<Conclusion>

Sinus bradycardia with short NC

Otherwise normal ECG

## 2018-09-18 NOTE — CT
Date of service: 



09/18/2018



PROCEDURE:  CT Abdomen and Pelvis with contrast



HISTORY:

abd pain



COMPARISON:

Abdomen pelvis CT with contrast 10/13/2015.



TECHNIQUE:

Following oral and intravenous contrast administration, a CT 

examination of the abdomen and pelvis performed from the domes of the 

diaphragms to the symphysis pubis with reformatted datasets provided 

not only axial but also sagittal and coronal series. Coronal and 

sagittal reformats were generated.



contrast dose: Omnipaque 300, 100 cc



Radiation dose:



Total exam DLP = 346.16 mGy-cm.



This CT exam was performed using one or more of the following dose 

reduction techniques: Automated exposure control, adjustment of the 

mA and/or kV according to patient size, and/or use of iterative 

reconstruction technique.



FINDINGS:



LOWER THORAX:

Mild cardiomegaly.  No pleural or pericardial effusion.  Scoliotic 

thoracic spine deformity identified.  Tiny hiatal hernia identified. 



LIVER:

A few stable scattered lucencies are seen at the left and right lobes 

liver with the largest measuring only 1.2 cm greatest dimension at 

the inferior right lobe compatible with a cyst once again.  The 

remainder of the liver is stable and unremarkable otherwise. 



GALLBLADDER AND BILE DUCTS:

Unremarkable. 



PANCREAS:

Unremarkable. No gross lesion or ductal dilatation.



SPLEEN:

Unremarkable. 



ADRENALS:

Unremarkable. No mass. 



KIDNEYS AND URETERS:

Unremarkable. No hydronephrosis. No solid mass. 



VASCULATURE:

Unremarkable. No aortic aneurysm. 



BOWEL:

Stomach is decompressed.  There is mild fecal loading throughout the 

colon.  The majority of bowel appears decompressed throughout.  

Peristalsis generate artifacts limiting bowel evaluation somewhat.  

Occasional left colonic diverticular changes are identified which 

appear nonacute.  



APPENDIX:

No definite CT evidence to suggest appendicitis at this time. 



PERITONEUM:

Unremarkable. No free fluid. No free air. 



LYMPH NODES:

Unremarkable. No enlarged lymph nodes. 



BLADDER:

Urinary bladder is completely decompressed and poorly evaluated 

secondarily. 



REPRODUCTIVE:

Enlarged prostate gland reiterated. 



BONES:

No acute fracture. 



OTHER FINDINGS:

None.



IMPRESSION:

1. No bowel obstruction, measure edema, ascites or free intra 

peritoneal gas identified.  Majority of large bowel is decompressed 

with limited retained fecal material scattered throughout various 

large-bowel segments.  Occasional left colonic diverticular changes 

without acute findings. 



2. Stable scattered lucencies infrequently seen at the left and right 

lobes liver once again probably reflecting small cysts. 



3. Enlarged prostate gland reiterated.

## 2018-09-18 NOTE — ED PDOC
HPI: Abdomen


Time Seen by Provider: 09/18/18 10:56


Chief Complaint (Nursing): Abdominal Pain


Chief Complaint (Provider): Abdominal pain and constipation


History Per: Patient


History/Exam Limitations: no limitations


Onset/Duration Of Symptoms: Days (x2 months)


Current Symptoms Are (Timing): Still Present


Associated Symptoms: denies: Fever, Chills, Nausea, Vomiting, Diarrhea, Chest 

Pain, Urinary Symptoms, Other (SOB)


Additional Complaint(s): 





Lesvia Serrano is a 78 year old male, with a past medical history of 

gastritis and BPH, who presents to the emergency department complaining of 

abdominal pain onset for x2 months associated with constipation. Patient states 

he hasn't had a bowel movement for several days and feels like food is stuck. 

Patient states he is able to urinate but denies any fever, chills, chest pain, 

shortness of breath, nausea, vomit, urinary symptoms, back pain, numbness or 

tingling, weakness, headache or dizziness. No further medical complaints.





PMD: None provided. 





Past Medical History


Reviewed: Historical Data, Nursing Documentation, Vital Signs


Vital Signs: 


 Last Vital Signs











Temp  99.0 F   09/18/18 10:45


 


Pulse  73   09/18/18 10:45


 


Resp  18   09/18/18 10:45


 


BP  103/69   09/18/18 10:45


 


Pulse Ox  97   09/18/18 16:01














- Medical History


PMH: Anxiety, Asthma, Benign Prostatic Hyperplasia, COPD, Depression, Gastritis

, HTN, Hypercholesterolemia


   Denies: Atrial Fibrillation, Cardia Arrhythmia, CHF, Chronic Kidney Disease





- Surgical History


Surgical History: Appendectomy





- Family History


Family History: States: Unknown Family Hx





- Immunization History


Hx Tetanus Toxoid Vaccination: No


Hx Influenza Vaccination: Yes (2017)


Hx Pneumococcal Vaccination: No





- Home Medications


Home Medications: 


 Ambulatory Orders











 Medication  Instructions  Recorded


 


Albuterol 0.083% [Albuterol 0.083% 3 ml IH DAILY #1 neb 08/25/18





Inhal Sol (2.5 mg/3 ml) UD]  


 


Alfuzosin HCl [Alfuzosin HCl ER] 10 mg PO DAILY #30 tab.er.24h 08/25/18


 


Finasteride [Proscar] 5 mg PO DAILY #30 tab 08/25/18


 


Fluticasone/Salmeterol [Advair 2 puff IH BID #1 blst.w.dev 08/25/18





250-50 Diskus]  


 


Lisinopril/Hydrochlorothiazide 1 each PO DAILY #30 tablet 08/25/18





[Zestoretic 20-12.5 mg Tablet]  


 


Montelukast Sodium [Singulair] 10 mg PO DAILY #30 tablet 08/25/18


 


Pantoprazole [Protonix EC Tab] 40 mg PO DAILY #30 ect 08/25/18


 


Rosuvastatin Calcium [Crestor] 10 mg PO HS #30 tab 08/25/18


 


Magnesium Citrate [[facility] 300 ml PO ONCE #1 bottle 09/03/18





Pharmacy Magnesium Citrate]  














- Allergies


Allergies/Adverse Reactions: 


 Allergies











Allergy/AdvReac Type Severity Reaction Status Date / Time


 


No Known Allergies Allergy   Verified 09/03/18 11:32














Review of Systems


ROS Statement: Except As Marked, All Systems Reviewed And Found Negative


Constitutional: Negative for: Fever, Chills


Cardiovascular: Negative for: Chest Pain


Respiratory: Negative for: Shortness of Breath


Gastrointestinal: Positive for: Abdominal Pain, Constipation.  Negative for: 

Nausea, Vomiting


Genitourinary Male: Negative for: Dysuria, Frequency, Incontinence


Musculoskeletal: Negative for: Back Pain


Neurological: Negative for: Weakness, Numbness (tingling), Headache, Dizziness





Physical Exam





- Reviewed


Nursing Documentation Reviewed: Yes


Vital Signs Reviewed: Yes





- Physical Exam


Appears: Positive for: No Acute Distress


Head Exam: Positive for: ATRAUMATIC, NORMOCEPHALIC


Skin: Positive for: Normal Color, Warm, Dry


Eye Exam: Positive for: Normal appearance, EOMI, PERRL


Neck: Positive for: Painless ROM


Cardiovascular/Chest: Positive for: Regular Rate, Rhythm.  Negative for: Murmur


Respiratory: Positive for: Normal Breath Sounds.  Negative for: Respiratory 

Distress


Gastrointestinal/Abdominal: Positive for: Tenderness (mild diffused).  Negative 

for: Mass, Distended, Guarding, Rebound


Back: Positive for: Normal Inspection.  Negative for: L CVA Tenderness, R CVA 

Tenderness, Vertebral Tenderness


Extremity: Positive for: Normal ROM (upper and lower extremity).  Negative for: 

Deformity


Neurologic/Psych: Positive for: Alert, Oriented.  Negative for: Motor/Sensory 

Deficits





- Laboratory Results


Result Diagrams: 


 09/18/18 12:00





 09/18/18 12:00


Interpretation Of Abn Labs: no acute





- ECG


O2 Sat by Pulse Oximetry: 97 (RA)


Pulse Ox Interpretation: Normal





- CT Scan/US


  ** ct


Other Rad Studies (CT/US): Read By Radiologist


Other Rad Interpretation: no acute





- Progress


ED Course And Treament: 





1700:  Stable.  No abd pain.  Tolerated PO.  AAOx3.  Wants to go home.  No more 

constipation per pt.  Possible bm with the oral contrast.  Pt. to fu with pcp.  





Medical Decision Making


Medical Decision Making: 





TIme: 10:56


Initial Impression: Abdominal pain and constipation





Initial Plan:





--Abd Pelvis PO & IV Contrast [CT]


--EKG


--CMP


--Lipase


--Troponin I


--Urine dip


--CBC w/ differential


--Sodium Chloride 1,000 ml IV 1,000 mls/hr


--Omnipaque 240 ml 50 ml PO


--Toradol 15 mg IVP


--Reevaluation





15:30


Abdomen/Pelvis CT


FINDINGS:





LOWER THORAX:


Mild cardiomegaly.  No pleural or pericardial effusion.  Scoliotic thoracic 

spine deformity identified.  Tiny hiatal hernia identified. 





LIVER:


A few stable scattered lucencies are seen at the left and right lobes liver 

with the largest measuring only 1.2 cm greatest dimension at the inferior right 

lobe compatible with a cyst once again.  The remainder of the liver is stable 

and unremarkable otherwise. 





GALLBLADDER AND BILE DUCTS:


Unremarkable. 





PANCREAS:


Unremarkable. No gross lesion or ductal dilatation.





SPLEEN:


Unremarkable. 





ADRENALS:


Unremarkable. No mass. 





KIDNEYS AND URETERS:


Unremarkable. No hydronephrosis. No solid mass. 





VASCULATURE:


Unremarkable. No aortic aneurysm. 





BOWEL:


Stomach is decompressed.  There is mild fecal loading throughout the colon.  

The majority of bowel appears decompressed throughout.  Peristalsis generate 

artifacts limiting bowel evaluation somewhat.  Occasional left colonic 

diverticular changes are identified which appear nonacute.  





APPENDIX:


No definite CT evidence to suggest appendicitis at this time. 





PERITONEUM:


Unremarkable. No free fluid. No free air. 





LYMPH NODES:


Unremarkable. No enlarged lymph nodes. 





BLADDER:


Urinary bladder is completely decompressed and poorly evaluated secondarily. 





REPRODUCTIVE:


Enlarged prostate gland reiterated. 





BONES:


No acute fracture. 





OTHER FINDINGS:


None.





IMPRESSION:


1. No bowel obstruction, measure edema, ascites or free intra peritoneal gas 

identified.  Majority of large bowel is decompressed with limited retained 

fecal material scattered throughout various large-bowel segments.  Occasional 

left colonic diverticular changes without acute findings. 





2. Stable scattered lucencies infrequently seen at the left and right lobes 

liver once again probably reflecting small cysts. 





3. Enlarged prostate gland reiterated.





--------------------------------------------------------------------------------

-----





Scribe Attestation:


Documented by Viet Shaffer, acting as a scribe for Etienne Hollingsworth MD.





Provider Scribe Attestation:


All medical record entries made by the Scribe were at my direction and 

personally dictated by me. I have reviewed the chart and agree that the record 

accurately reflects my personal performance of the history, physical exam, 

medical decision making, and the department course for this patient. I have 

also personally directed, reviewed, and agree with the discharge instructions 

and disposition.





Disposition





- Clinical Impression


Clinical Impression: 


 Abdominal discomfort








- Patient ED Disposition


Is Patient to be Admitted: No


Counseled Patient/Family Regarding: Studies Performed, Diagnosis, Need For 

Followup





- Disposition


Referrals: 


[facility] [Outside] - 09/20/18


Disposition: Routine/Home


Disposition Time: 17:27


Condition: STABLE


Additional Instructions: 


Return if not better in 3 days.  


Instructions:  Stomach Ache and Stomach Upset


Print Language: Irish